# Patient Record
Sex: MALE | Race: WHITE | NOT HISPANIC OR LATINO | Employment: FULL TIME | URBAN - NONMETROPOLITAN AREA
[De-identification: names, ages, dates, MRNs, and addresses within clinical notes are randomized per-mention and may not be internally consistent; named-entity substitution may affect disease eponyms.]

---

## 2024-01-23 ENCOUNTER — APPOINTMENT (OUTPATIENT)
Dept: CARDIOLOGY | Facility: HOSPITAL | Age: 64
End: 2024-01-23
Payer: MEDICARE

## 2024-01-23 ENCOUNTER — APPOINTMENT (OUTPATIENT)
Dept: RADIOLOGY | Facility: HOSPITAL | Age: 64
End: 2024-01-23
Payer: MEDICARE

## 2024-01-23 ENCOUNTER — HOSPITAL ENCOUNTER (EMERGENCY)
Facility: HOSPITAL | Age: 64
Discharge: OTHER INSTITUTION | End: 2024-01-24
Attending: FAMILY MEDICINE
Payer: MEDICARE

## 2024-01-23 DIAGNOSIS — J15.9 BACTERIAL PNEUMONIA: ICD-10-CM

## 2024-01-23 DIAGNOSIS — R07.9 CHEST PAIN, UNSPECIFIED TYPE: Primary | ICD-10-CM

## 2024-01-23 DIAGNOSIS — I25.9 CHEST PAIN DUE TO MYOCARDIAL ISCHEMIA, UNSPECIFIED ISCHEMIC CHEST PAIN TYPE: ICD-10-CM

## 2024-01-23 DIAGNOSIS — Z95.5 HISTORY OF CORONARY ARTERY STENT PLACEMENT: ICD-10-CM

## 2024-01-23 DIAGNOSIS — J10.1 INFLUENZA A H1N1 INFECTION: ICD-10-CM

## 2024-01-23 LAB
ALBUMIN SERPL BCP-MCNC: 4.4 G/DL (ref 3.4–5)
ALP SERPL-CCNC: 69 U/L (ref 33–136)
ALT SERPL W P-5'-P-CCNC: 43 U/L (ref 10–52)
ANION GAP BLDV CALCULATED.4IONS-SCNC: 10 MMOL/L (ref 10–25)
ANION GAP SERPL CALC-SCNC: 13 MMOL/L (ref 10–20)
APPEARANCE UR: CLEAR
AST SERPL W P-5'-P-CCNC: 39 U/L (ref 9–39)
ATRIAL RATE: 76 BPM
BASE EXCESS BLDV CALC-SCNC: 1.1 MMOL/L (ref -2–3)
BASOPHILS # BLD AUTO: 0.03 X10*3/UL (ref 0–0.1)
BASOPHILS NFR BLD AUTO: 0.4 %
BILIRUB SERPL-MCNC: 0.9 MG/DL (ref 0–1.2)
BILIRUB UR STRIP.AUTO-MCNC: NEGATIVE MG/DL
BNP SERPL-MCNC: 520 PG/ML (ref 0–99)
BODY TEMPERATURE: ABNORMAL
BUN SERPL-MCNC: 22 MG/DL (ref 6–23)
CA-I BLDV-SCNC: 1.17 MMOL/L (ref 1.1–1.33)
CALCIUM SERPL-MCNC: 8.9 MG/DL (ref 8.6–10.3)
CARDIAC TROPONIN I PNL SERPL HS: 98 NG/L (ref 0–20)
CARDIAC TROPONIN I PNL SERPL HS: 99 NG/L (ref 0–20)
CHLORIDE BLDV-SCNC: 100 MMOL/L (ref 98–107)
CHLORIDE SERPL-SCNC: 97 MMOL/L (ref 98–107)
CO2 SERPL-SCNC: 28 MMOL/L (ref 21–32)
COLOR UR: YELLOW
CREAT SERPL-MCNC: 1.06 MG/DL (ref 0.5–1.3)
D DIMER PPP FEU-MCNC: 700 NG/ML FEU
EGFRCR SERPLBLD CKD-EPI 2021: 78 ML/MIN/1.73M*2
EOSINOPHIL # BLD AUTO: 0.01 X10*3/UL (ref 0–0.7)
EOSINOPHIL NFR BLD AUTO: 0.1 %
ERYTHROCYTE [DISTWIDTH] IN BLOOD BY AUTOMATED COUNT: 13.4 % (ref 11.5–14.5)
FLOW: 0 LPM
FLUAV RNA RESP QL NAA+PROBE: DETECTED
FLUBV RNA RESP QL NAA+PROBE: NOT DETECTED
GLUCOSE BLDV-MCNC: 154 MG/DL (ref 74–99)
GLUCOSE SERPL-MCNC: 155 MG/DL (ref 74–99)
GLUCOSE UR STRIP.AUTO-MCNC: NEGATIVE MG/DL
HCO3 BLDV-SCNC: 25.1 MMOL/L (ref 22–26)
HCT VFR BLD AUTO: 45.6 % (ref 41–52)
HCT VFR BLD EST: 42 % (ref 41–52)
HGB BLD-MCNC: 14.4 G/DL (ref 13.5–17.5)
HGB BLDV-MCNC: 14.1 G/DL (ref 13.5–17.5)
IMM GRANULOCYTES # BLD AUTO: 0.03 X10*3/UL (ref 0–0.7)
IMM GRANULOCYTES NFR BLD AUTO: 0.4 % (ref 0–0.9)
INHALED O2 CONCENTRATION: 21 %
INR PPP: 1.2 (ref 0.9–1.1)
KETONES UR STRIP.AUTO-MCNC: NEGATIVE MG/DL
LACTATE BLDV-SCNC: 1.2 MMOL/L (ref 0.4–2)
LACTATE SERPL-SCNC: 1.1 MMOL/L (ref 0.4–2)
LEUKOCYTE ESTERASE UR QL STRIP.AUTO: NEGATIVE
LYMPHOCYTES # BLD AUTO: 0.51 X10*3/UL (ref 1.2–4.8)
LYMPHOCYTES NFR BLD AUTO: 6.1 %
MCH RBC QN AUTO: 29.8 PG (ref 26–34)
MCHC RBC AUTO-ENTMCNC: 31.6 G/DL (ref 32–36)
MCV RBC AUTO: 94 FL (ref 80–100)
MONOCYTES # BLD AUTO: 0.67 X10*3/UL (ref 0.1–1)
MONOCYTES NFR BLD AUTO: 8 %
MUCOUS THREADS #/AREA URNS AUTO: ABNORMAL /LPF
NEUTROPHILS # BLD AUTO: 7.08 X10*3/UL (ref 1.2–7.7)
NEUTROPHILS NFR BLD AUTO: 85 %
NITRITE UR QL STRIP.AUTO: NEGATIVE
NRBC BLD-RTO: 0 /100 WBCS (ref 0–0)
OXYHGB MFR BLDV: 81.5 % (ref 45–75)
P AXIS: 76 DEGREES
P OFFSET: 161 MS
P ONSET: 124 MS
PCO2 BLDV: 37 MM HG (ref 41–51)
PH BLDV: 7.44 PH (ref 7.33–7.43)
PH UR STRIP.AUTO: 5 [PH]
PLATELET # BLD AUTO: 140 X10*3/UL (ref 150–450)
PO2 BLDV: 51 MM HG (ref 35–45)
POTASSIUM BLDV-SCNC: 3.5 MMOL/L (ref 3.5–5.3)
POTASSIUM SERPL-SCNC: 3.8 MMOL/L (ref 3.5–5.3)
PR INTERVAL: 192 MS
PROT SERPL-MCNC: 6.8 G/DL (ref 6.4–8.2)
PROT UR STRIP.AUTO-MCNC: ABNORMAL MG/DL
PROTHROMBIN TIME: 13.2 SECONDS (ref 9.8–12.8)
Q ONSET: 220 MS
QRS COUNT: 12 BEATS
QRS DURATION: 96 MS
QT INTERVAL: 380 MS
QTC CALCULATION(BAZETT): 427 MS
QTC FREDERICIA: 411 MS
R AXIS: -1 DEGREES
RBC # BLD AUTO: 4.83 X10*6/UL (ref 4.5–5.9)
RBC # UR STRIP.AUTO: ABNORMAL /UL
RBC #/AREA URNS AUTO: >20 /HPF
SAO2 % BLDV: 84 % (ref 45–75)
SARS-COV-2 RNA RESP QL NAA+PROBE: NOT DETECTED
SODIUM BLDV-SCNC: 132 MMOL/L (ref 136–145)
SODIUM SERPL-SCNC: 134 MMOL/L (ref 136–145)
SP GR UR STRIP.AUTO: 1.03
T AXIS: 98 DEGREES
T OFFSET: 410 MS
UROBILINOGEN UR STRIP.AUTO-MCNC: 4 MG/DL
VENTRICULAR RATE: 76 BPM
WBC # BLD AUTO: 8.3 X10*3/UL (ref 4.4–11.3)
WBC #/AREA URNS AUTO: ABNORMAL /HPF

## 2024-01-23 PROCEDURE — 85025 COMPLETE CBC W/AUTO DIFF WBC: CPT | Performed by: FAMILY MEDICINE

## 2024-01-23 PROCEDURE — 36415 COLL VENOUS BLD VENIPUNCTURE: CPT | Performed by: FAMILY MEDICINE

## 2024-01-23 PROCEDURE — 84132 ASSAY OF SERUM POTASSIUM: CPT | Performed by: FAMILY MEDICINE

## 2024-01-23 PROCEDURE — 2500000002 HC RX 250 W HCPCS SELF ADMINISTERED DRUGS (ALT 637 FOR MEDICARE OP, ALT 636 FOR OP/ED)

## 2024-01-23 PROCEDURE — 96365 THER/PROPH/DIAG IV INF INIT: CPT

## 2024-01-23 PROCEDURE — 94640 AIRWAY INHALATION TREATMENT: CPT

## 2024-01-23 PROCEDURE — 9420000001 HC RT PATIENT EDUCATION 5 MIN

## 2024-01-23 PROCEDURE — 2550000001 HC RX 255 CONTRASTS: Performed by: FAMILY MEDICINE

## 2024-01-23 PROCEDURE — 81001 URINALYSIS AUTO W/SCOPE: CPT | Performed by: FAMILY MEDICINE

## 2024-01-23 PROCEDURE — 83880 ASSAY OF NATRIURETIC PEPTIDE: CPT | Performed by: FAMILY MEDICINE

## 2024-01-23 PROCEDURE — 71045 X-RAY EXAM CHEST 1 VIEW: CPT

## 2024-01-23 PROCEDURE — 85610 PROTHROMBIN TIME: CPT | Performed by: FAMILY MEDICINE

## 2024-01-23 PROCEDURE — 83605 ASSAY OF LACTIC ACID: CPT | Performed by: FAMILY MEDICINE

## 2024-01-23 PROCEDURE — 71275 CT ANGIOGRAPHY CHEST: CPT | Performed by: RADIOLOGY

## 2024-01-23 PROCEDURE — 71275 CT ANGIOGRAPHY CHEST: CPT

## 2024-01-23 PROCEDURE — 85379 FIBRIN DEGRADATION QUANT: CPT | Performed by: FAMILY MEDICINE

## 2024-01-23 PROCEDURE — 94664 DEMO&/EVAL PT USE INHALER: CPT

## 2024-01-23 PROCEDURE — 99285 EMERGENCY DEPT VISIT HI MDM: CPT | Performed by: FAMILY MEDICINE

## 2024-01-23 PROCEDURE — 84484 ASSAY OF TROPONIN QUANT: CPT | Performed by: FAMILY MEDICINE

## 2024-01-23 PROCEDURE — 93005 ELECTROCARDIOGRAM TRACING: CPT

## 2024-01-23 PROCEDURE — 80053 COMPREHEN METABOLIC PANEL: CPT | Performed by: FAMILY MEDICINE

## 2024-01-23 PROCEDURE — 2500000004 HC RX 250 GENERAL PHARMACY W/ HCPCS (ALT 636 FOR OP/ED)

## 2024-01-23 PROCEDURE — 87040 BLOOD CULTURE FOR BACTERIA: CPT | Mod: CONLAB | Performed by: FAMILY MEDICINE

## 2024-01-23 PROCEDURE — 71045 X-RAY EXAM CHEST 1 VIEW: CPT | Performed by: RADIOLOGY

## 2024-01-23 PROCEDURE — 87636 SARSCOV2 & INF A&B AMP PRB: CPT | Performed by: FAMILY MEDICINE

## 2024-01-23 RX ORDER — METFORMIN HYDROCHLORIDE 500 MG/1
500 TABLET ORAL
Status: ON HOLD | COMMUNITY
End: 2024-01-26 | Stop reason: SDUPTHER

## 2024-01-23 RX ORDER — CLOPIDOGREL BISULFATE 75 MG/1
75 TABLET ORAL DAILY
COMMUNITY

## 2024-01-23 RX ORDER — ACETAMINOPHEN 325 MG/1
650 TABLET ORAL ONCE
Status: COMPLETED | OUTPATIENT
Start: 2024-01-23 | End: 2024-01-23

## 2024-01-23 RX ORDER — ATORVASTATIN CALCIUM 80 MG/1
80 TABLET, FILM COATED ORAL NIGHTLY
COMMUNITY

## 2024-01-23 RX ORDER — CEFTRIAXONE 1 G/50ML
1 INJECTION, SOLUTION INTRAVENOUS ONCE
Status: COMPLETED | OUTPATIENT
Start: 2024-01-23 | End: 2024-01-23

## 2024-01-23 RX ORDER — CITALOPRAM 20 MG/1
20 TABLET, FILM COATED ORAL DAILY
COMMUNITY

## 2024-01-23 RX ORDER — IPRATROPIUM BROMIDE AND ALBUTEROL SULFATE 2.5; .5 MG/3ML; MG/3ML
3 SOLUTION RESPIRATORY (INHALATION) ONCE
Status: COMPLETED | OUTPATIENT
Start: 2024-01-23 | End: 2024-01-23

## 2024-01-23 RX ORDER — ACETAMINOPHEN 325 MG/1
TABLET ORAL
Status: COMPLETED
Start: 2024-01-23 | End: 2024-01-23

## 2024-01-23 RX ORDER — AMLODIPINE BESYLATE 10 MG/1
10 TABLET ORAL DAILY
COMMUNITY

## 2024-01-23 RX ORDER — ASPIRIN 81 MG/1
81 TABLET ORAL DAILY
COMMUNITY

## 2024-01-23 RX ORDER — LOSARTAN POTASSIUM 25 MG/1
25 TABLET ORAL DAILY
COMMUNITY
End: 2024-01-26 | Stop reason: HOSPADM

## 2024-01-23 RX ORDER — IPRATROPIUM BROMIDE AND ALBUTEROL SULFATE 2.5; .5 MG/3ML; MG/3ML
SOLUTION RESPIRATORY (INHALATION)
Status: COMPLETED
Start: 2024-01-23 | End: 2024-01-23

## 2024-01-23 RX ORDER — AZITHROMYCIN 100 MG/ML
INJECTION, POWDER, LYOPHILIZED, FOR SOLUTION INTRAVENOUS
Status: COMPLETED
Start: 2024-01-23 | End: 2024-01-23

## 2024-01-23 RX ORDER — CEFTRIAXONE 1 G/50ML
INJECTION, SOLUTION INTRAVENOUS
Status: COMPLETED
Start: 2024-01-23 | End: 2024-01-23

## 2024-01-23 RX ORDER — METOPROLOL SUCCINATE 25 MG/1
25 TABLET, EXTENDED RELEASE ORAL DAILY
COMMUNITY

## 2024-01-23 RX ADMIN — IPRATROPIUM BROMIDE AND ALBUTEROL SULFATE 3 ML: 2.5; .5 SOLUTION RESPIRATORY (INHALATION) at 03:21

## 2024-01-23 RX ADMIN — ACETAMINOPHEN 650 MG: 325 TABLET ORAL at 08:22

## 2024-01-23 RX ADMIN — IOHEXOL 50 ML: 350 INJECTION, SOLUTION INTRAVENOUS at 05:12

## 2024-01-23 RX ADMIN — CEFTRIAXONE 1 G: 1 INJECTION, SOLUTION INTRAVENOUS at 07:18

## 2024-01-23 RX ADMIN — AZITHROMYCIN 500 MG: 500 INJECTION, POWDER, LYOPHILIZED, FOR SOLUTION INTRAVENOUS at 07:59

## 2024-01-23 RX ADMIN — IPRATROPIUM BROMIDE AND ALBUTEROL SULFATE 3 ML: .5; 3 SOLUTION RESPIRATORY (INHALATION) at 03:21

## 2024-01-23 ASSESSMENT — COLUMBIA-SUICIDE SEVERITY RATING SCALE - C-SSRS
1. IN THE PAST MONTH, HAVE YOU WISHED YOU WERE DEAD OR WISHED YOU COULD GO TO SLEEP AND NOT WAKE UP?: NO
2. HAVE YOU ACTUALLY HAD ANY THOUGHTS OF KILLING YOURSELF?: NO
6. HAVE YOU EVER DONE ANYTHING, STARTED TO DO ANYTHING, OR PREPARED TO DO ANYTHING TO END YOUR LIFE?: NO

## 2024-01-23 ASSESSMENT — PAIN - FUNCTIONAL ASSESSMENT
PAIN_FUNCTIONAL_ASSESSMENT: 0-10
PAIN_FUNCTIONAL_ASSESSMENT: 0-10

## 2024-01-23 ASSESSMENT — PAIN SCALES - GENERAL: PAINLEVEL_OUTOF10: 3

## 2024-01-23 NOTE — ED PROVIDER NOTES
HPI   Chief Complaint   Patient presents with    Fatigue       HPI  This 63-year-old male patient history of coronary artery disease history of coronary artery stent placement hypertension and COPD came to the emergency room with a complaint of cough which she describes as a dry cough causing chest wall pain.  On the right side of chest symptoms present for last several days worse for last few days and decided come to ER for evaluation today.  He admitted to chronic peripheral edema and history of COPD quit smoking about 18 months ago.  He has history of coronary artery stent but not has been on Plavix and other medication.  Denies recent travel surgery or hospitalization history of blood clots any pain with deep breath.  Denies dizziness or change in palpitation.  Denies blackout or pass out..  Denies any abdominal pain.  Denies vomiting or diarrhea.  Will evaluate shortness up-to-date.  Patient denies any recent travel surgery hospitalization history of blood clot.        Family history: Reviewed  Social history: Reviewed quit smoking about 18 months ago denies substance abuse  Review of system: 10 review of system obtained review of system as described in HPI otherwise negative.                  Irena Coma Scale Score: 15                  Patient History   Past Medical History:   Diagnosis Date    Depression, unspecified 07/19/2019    Depression    Personal history of other diseases of the respiratory system 03/21/2017    History of acute sinusitis    Personal history of other diseases of the respiratory system 03/21/2017    History of acute sinusitis    Personal history of other specified conditions 10/10/2014    History of dysuria     Past Surgical History:   Procedure Laterality Date    ANKLE SURGERY  04/20/2015    Ankle Surgery    TONSILLECTOMY  04/20/2015    Tonsillectomy     No family history on file.  Social History     Tobacco Use    Smoking status: Not on file    Smokeless tobacco: Not on file    Substance Use Topics    Alcohol use: Not on file    Drug use: Not on file       Physical Exam   ED Triage Vitals   Temp Heart Rate Respirations BP   01/23/24 0317 01/23/24 0317 01/23/24 0317 01/23/24 0317   38 °C (100.4 °F) 67 (!) 22 160/85      SpO2 Temp Source Heart Rate Source Patient Position   01/23/24 0317 01/23/24 0317 -- 01/23/24 0317   91 % Temporal  Sitting      BP Location FiO2 (%)     01/23/24 0317 01/23/24 0321     Left arm 28 %       Physical Exam    CONSTITUTIONAL: Chronically sick looking male patient who was awake alert oriented x 3.  Sitting upright position noted to have 2+ pitting edema with chronic skin changes.  He was noted no tachypnea but he was already on O2 through nasal cannula.  Alert oriented x 3.     HENMT: The airway was intact. There was no ear or nose discharge. No drooling or stridor. Neck was supple. Talking and breathing comfortably.  EYES: Clear bilaterally, pupils equal, round and reactive to light. CARDIOVASCULAR: Regular rate and rhythm. No friction rub or murmur good peripheral pulses no peripheral edema. Nontender Homans sign negative. RESPIRATORY: Patient was breathing comfortably. No tachypnea no respiratory distress.  On auscultation he has diminished breath sounds crackles in the lung bases.  However has good skin perfusion.       GASTROINTESTINAL: Abdomen soft positive bowel sounds noted right lower quadrant tenderness no guarding, rebound surgery no CVA tenderness 20 no pulsatile mass.   GENITOURINARY:  No costovertebral angle tenderness. MUSCULOSKELETAL: Head was normocephalic atraumatic cervical thoracic lumbar spine nontender.  Chest was nontender  Both upper extremity good motion nontender intact distal pulse intact sensation. NEUROLOGICAL: Awake alert pleasant and cooperative. No motor or sensory deficit no arms selective noted. Intact neurovascular function and motor function. No facial drooping or drooling. Talking and breathing comfortably.  Cranial nerves  II to XII grossly intact. No arms selective noted. No nystagmus.  SKIN: Skin normal color for race, warm, dry and intact. No evidence of trauma or lesions. PSYCHIATRIC: Awake alert and without acute distress. No obvious depression, no suicidal thoughts or ideation. Appropriate mood. Talking and normal tone. HEME/LYMPH: No adenopathy or splenomegaly.        CRITICAL CARE    VITAL SIGNS:       heart rate 60 respiratory 19 blood pressure 152/71 pulse ox 92% on O2 through nasal cannula           DISPOSITION      ED Course & MDM   Diagnoses as of 02/26/24 1817   Chest pain, unspecified type   Chest pain due to myocardial ischemia, unspecified ischemic chest pain type   History of coronary artery stent placement   Bacterial pneumonia   Influenza A H1N1 infection   EKG obtained at 0301 hrs. in the morning showed sinus rhythm with PACs.  Artifact during the interpretation.  Inferior infarct infarct age undetermined.  No ST-T evaluation.  Nonspecific ST-T wave changes.  No STEMI.  Abnormal EKG AR at this EKG.    Medical Decision Making  63-year-old male patient history of coronary artery history of coronary artery stent placement has been on Plavix and multiple other medication he also has history of COPD has been having cough congestion and chest pain right-sided chest pain for last 2 to 3 days and decided come to the ER for evaluation early this morning.  He denies any pain in the arms admitted to chronic swelling the legs.  Denies hemoptysis, recent travel surgery or hospitalization.  He felt feverish but denies any chills.    Upon examination chronically sick looking male patient was awake and alert able to talk clearly and provide good history.  HEENT examination unremarkable neck is supple on auscultation he has some coarse rhonchi few scattered expiratory wheezing but moving air well bilaterally heart regular rate and neurovascularly abdomen soft nontender with noted chronic stasis edema type changes of the lower  extremity without any CP mild edema noted calf is nontender Homans' sign negative.  Intact distal pulse intact sensation.    EKG showed normal sinus rhythm with PVCs and finding consistent with inferior infarct age undetermined.  No STEMI.  Patient first troponin was 202nd troponin was 98 he also has mildly elevated D-dimer and his influenza A was positive influenza B and COVID-19 test were negative.  CTA chest showed no evidence of PE chronic lung disease secondary to tobacco smoking with right-sided infiltrate.    Blood culture obtained and patient was put on Zithromax and Rocephin he has been having symptoms for more than 2 days says that he was out of window of treatment for influenza A infection.  Due to underlying coronary artery disease abnormal EKG mildly elevated troponin pneumonia I requested transfer to higher level facility, there are no beds available at [facility] however transfer center advised to put a request for transfer and they will try to find a bed for this patient.  Patient and family agreed.  He is hemodynamic stable.  No hypoxemia or respiratory distress.    Patient will be signed out to Dr. Melgar, who is here to relieve me this morning at 8:00.  Procedure  Procedures     Armando Gong MD  02/26/24 1817

## 2024-01-23 NOTE — DISCHARGE INSTRUCTIONS
Patient's troponin was 100 9 repeat troponin 99 he came with complaint of right-sided chest pain for last couple days cough and congestion, upon examination coarse rhonchi and some crackles bilaterally in lung bases peripheral edema calf ulcer nontender.  No JVD.  Heart regular rate rhythm lungs auscultated abdomen soft nontender.  Transfer request has been placed through our transfer center  Patient is being signed out to Dr. Melgar at 8 AM.  EKG showed no ST-T evaluation.  Inferior infarct age undetermined heart rate of 76.  His first set of troponin 102nd troponin is 98.  CT angio showed right sided infiltrate he also have positive influenza A, but COVID-19 was negative.  Patient is hemodynamic stable.  I did order blood cultures Rocephin and Zithromax and requested transfer to [facility] any other high-level facility.  Due to elevated troponin prior history of stent placement and multiple comorbidities it was recommended to be transferred to high-level facility.  No beds at [facility] however transfer center advised her to put a request for transfer there to try to find a bed for this patient and one of the higher level facility.  Patient and family agreed.

## 2024-01-24 ENCOUNTER — APPOINTMENT (OUTPATIENT)
Dept: CARDIOLOGY | Facility: HOSPITAL | Age: 64
DRG: 193 | End: 2024-01-24
Payer: MEDICARE

## 2024-01-24 ENCOUNTER — HOSPITAL ENCOUNTER (INPATIENT)
Facility: HOSPITAL | Age: 64
LOS: 2 days | Discharge: HOME | DRG: 193 | End: 2024-01-26
Attending: INTERNAL MEDICINE | Admitting: INTERNAL MEDICINE
Payer: MEDICARE

## 2024-01-24 VITALS
RESPIRATION RATE: 18 BRPM | TEMPERATURE: 98 F | OXYGEN SATURATION: 96 % | SYSTOLIC BLOOD PRESSURE: 160 MMHG | HEIGHT: 73 IN | HEART RATE: 63 BPM | WEIGHT: 304.68 LBS | DIASTOLIC BLOOD PRESSURE: 72 MMHG | BODY MASS INDEX: 40.38 KG/M2

## 2024-01-24 DIAGNOSIS — E11.9 TYPE 2 DIABETES MELLITUS WITHOUT COMPLICATION, WITHOUT LONG-TERM CURRENT USE OF INSULIN (MULTI): ICD-10-CM

## 2024-01-24 DIAGNOSIS — J11.00 PNEUMONIA AND INFLUENZA: Primary | ICD-10-CM

## 2024-01-24 DIAGNOSIS — I50.9 HEART FAILURE, UNSPECIFIED (MULTI): ICD-10-CM

## 2024-01-24 DIAGNOSIS — J15.9 BACTERIAL PNEUMONIA: ICD-10-CM

## 2024-01-24 DIAGNOSIS — J10.1 INFLUENZA A H1N1 INFECTION: ICD-10-CM

## 2024-01-24 DIAGNOSIS — I50.33 ACUTE ON CHRONIC DIASTOLIC CONGESTIVE HEART FAILURE (MULTI): ICD-10-CM

## 2024-01-24 LAB
ANION GAP SERPL CALC-SCNC: 13 MMOL/L (ref 10–20)
AORTIC VALVE MEAN GRADIENT: 6 MMHG
AORTIC VALVE PEAK VELOCITY: 1.87 M/S
AV PEAK GRADIENT: 14 MMHG
AVA (PEAK VEL): 2.42 CM2
AVA (VTI): 2.28 CM2
BUN SERPL-MCNC: 22 MG/DL (ref 6–23)
CALCIUM SERPL-MCNC: 8.4 MG/DL (ref 8.6–10.3)
CHLORIDE SERPL-SCNC: 102 MMOL/L (ref 98–107)
CO2 SERPL-SCNC: 26 MMOL/L (ref 21–32)
CREAT SERPL-MCNC: 0.94 MG/DL (ref 0.5–1.3)
EGFRCR SERPLBLD CKD-EPI 2021: 90 ML/MIN/1.73M*2
EJECTION FRACTION APICAL 4 CHAMBER: 44.6
EJECTION FRACTION: 44 %
ERYTHROCYTE [DISTWIDTH] IN BLOOD BY AUTOMATED COUNT: 13.7 % (ref 11.5–14.5)
GLUCOSE BLD MANUAL STRIP-MCNC: 112 MG/DL (ref 74–99)
GLUCOSE BLD MANUAL STRIP-MCNC: 117 MG/DL (ref 74–99)
GLUCOSE BLD MANUAL STRIP-MCNC: 152 MG/DL (ref 74–99)
GLUCOSE BLD MANUAL STRIP-MCNC: 97 MG/DL (ref 74–99)
GLUCOSE SERPL-MCNC: 111 MG/DL (ref 74–99)
HCT VFR BLD AUTO: 44.3 % (ref 41–52)
HGB BLD-MCNC: 14.6 G/DL (ref 13.5–17.5)
LEFT ATRIUM VOLUME AREA LENGTH INDEX BSA: 32.8 ML/M2
LEFT VENTRICLE INTERNAL DIMENSION DIASTOLE: 6.04 CM (ref 3.5–6)
LEFT VENTRICULAR OUTFLOW TRACT DIAMETER: 2 CM
MCH RBC QN AUTO: 29.1 PG (ref 26–34)
MCHC RBC AUTO-ENTMCNC: 33 G/DL (ref 32–36)
MCV RBC AUTO: 88 FL (ref 80–100)
MITRAL VALVE E/A RATIO: 0.86
MITRAL VALVE E/E' RATIO: 5.66
NRBC BLD-RTO: 0 /100 WBCS (ref 0–0)
PLATELET # BLD AUTO: 134 X10*3/UL (ref 150–450)
POTASSIUM SERPL-SCNC: 3.5 MMOL/L (ref 3.5–5.3)
RBC # BLD AUTO: 5.01 X10*6/UL (ref 4.5–5.9)
RIGHT VENTRICLE FREE WALL PEAK S': 13.9 CM/S
RIGHT VENTRICLE PEAK SYSTOLIC PRESSURE: 20.1 MMHG
SODIUM SERPL-SCNC: 137 MMOL/L (ref 136–145)
TRICUSPID ANNULAR PLANE SYSTOLIC EXCURSION: 2.7 CM
WBC # BLD AUTO: 4.9 X10*3/UL (ref 4.4–11.3)

## 2024-01-24 PROCEDURE — 93306 TTE W/DOPPLER COMPLETE: CPT

## 2024-01-24 PROCEDURE — 36415 COLL VENOUS BLD VENIPUNCTURE: CPT | Performed by: NURSE PRACTITIONER

## 2024-01-24 PROCEDURE — 2500000002 HC RX 250 W HCPCS SELF ADMINISTERED DRUGS (ALT 637 FOR MEDICARE OP, ALT 636 FOR OP/ED): Performed by: NURSE PRACTITIONER

## 2024-01-24 PROCEDURE — 1100000001 HC PRIVATE ROOM DAILY

## 2024-01-24 PROCEDURE — 2500000001 HC RX 250 WO HCPCS SELF ADMINISTERED DRUGS (ALT 637 FOR MEDICARE OP): Performed by: INTERNAL MEDICINE

## 2024-01-24 PROCEDURE — 82947 ASSAY GLUCOSE BLOOD QUANT: CPT

## 2024-01-24 PROCEDURE — 99223 1ST HOSP IP/OBS HIGH 75: CPT | Performed by: NURSE PRACTITIONER

## 2024-01-24 PROCEDURE — 2500000002 HC RX 250 W HCPCS SELF ADMINISTERED DRUGS (ALT 637 FOR MEDICARE OP, ALT 636 FOR OP/ED): Performed by: INTERNAL MEDICINE

## 2024-01-24 PROCEDURE — 85027 COMPLETE CBC AUTOMATED: CPT | Performed by: NURSE PRACTITIONER

## 2024-01-24 PROCEDURE — 84145 PROCALCITONIN (PCT): CPT | Mod: GEALAB | Performed by: INTERNAL MEDICINE

## 2024-01-24 PROCEDURE — 87449 NOS EACH ORGANISM AG IA: CPT | Mod: GEALAB | Performed by: NURSE PRACTITIONER

## 2024-01-24 PROCEDURE — 2500000004 HC RX 250 GENERAL PHARMACY W/ HCPCS (ALT 636 FOR OP/ED): Performed by: NURSE PRACTITIONER

## 2024-01-24 PROCEDURE — 2500000004 HC RX 250 GENERAL PHARMACY W/ HCPCS (ALT 636 FOR OP/ED): Performed by: INTERNAL MEDICINE

## 2024-01-24 PROCEDURE — 80048 BASIC METABOLIC PNL TOTAL CA: CPT | Performed by: NURSE PRACTITIONER

## 2024-01-24 PROCEDURE — 2500000001 HC RX 250 WO HCPCS SELF ADMINISTERED DRUGS (ALT 637 FOR MEDICARE OP): Performed by: NURSE PRACTITIONER

## 2024-01-24 PROCEDURE — 36415 COLL VENOUS BLD VENIPUNCTURE: CPT | Performed by: INTERNAL MEDICINE

## 2024-01-24 PROCEDURE — 93306 TTE W/DOPPLER COMPLETE: CPT | Performed by: INTERNAL MEDICINE

## 2024-01-24 PROCEDURE — 94640 AIRWAY INHALATION TREATMENT: CPT

## 2024-01-24 PROCEDURE — 87899 AGENT NOS ASSAY W/OPTIC: CPT | Mod: GEALAB | Performed by: NURSE PRACTITIONER

## 2024-01-24 RX ORDER — CLOPIDOGREL BISULFATE 75 MG/1
75 TABLET ORAL DAILY
Status: DISCONTINUED | OUTPATIENT
Start: 2024-01-24 | End: 2024-01-26 | Stop reason: HOSPADM

## 2024-01-24 RX ORDER — ACETAMINOPHEN 325 MG/1
TABLET ORAL
Status: COMPLETED
Start: 2024-01-24 | End: 2024-01-24

## 2024-01-24 RX ORDER — GUAIFENESIN 600 MG/1
1200 TABLET, EXTENDED RELEASE ORAL DAILY
Status: DISCONTINUED | OUTPATIENT
Start: 2024-01-24 | End: 2024-01-26 | Stop reason: HOSPADM

## 2024-01-24 RX ORDER — IPRATROPIUM BROMIDE AND ALBUTEROL SULFATE 2.5; .5 MG/3ML; MG/3ML
3 SOLUTION RESPIRATORY (INHALATION)
Status: DISCONTINUED | OUTPATIENT
Start: 2024-01-24 | End: 2024-01-26 | Stop reason: HOSPADM

## 2024-01-24 RX ORDER — FUROSEMIDE 10 MG/ML
20 INJECTION INTRAMUSCULAR; INTRAVENOUS EVERY 12 HOURS
Status: DISCONTINUED | OUTPATIENT
Start: 2024-01-24 | End: 2024-01-26 | Stop reason: HOSPADM

## 2024-01-24 RX ORDER — HYDRALAZINE HYDROCHLORIDE 20 MG/ML
10 INJECTION INTRAMUSCULAR; INTRAVENOUS ONCE
Status: COMPLETED | OUTPATIENT
Start: 2024-01-24 | End: 2024-01-24

## 2024-01-24 RX ORDER — ACETAMINOPHEN 325 MG/1
650 TABLET ORAL ONCE
Status: COMPLETED | OUTPATIENT
Start: 2024-01-24 | End: 2024-01-24

## 2024-01-24 RX ORDER — METOPROLOL SUCCINATE 25 MG/1
25 TABLET, EXTENDED RELEASE ORAL DAILY
Status: DISCONTINUED | OUTPATIENT
Start: 2024-01-24 | End: 2024-01-26 | Stop reason: HOSPADM

## 2024-01-24 RX ORDER — CEFTRIAXONE 1 G/50ML
1 INJECTION, SOLUTION INTRAVENOUS EVERY 24 HOURS
Status: DISCONTINUED | OUTPATIENT
Start: 2024-01-24 | End: 2024-01-25

## 2024-01-24 RX ORDER — ALBUTEROL SULFATE 0.83 MG/ML
2.5 SOLUTION RESPIRATORY (INHALATION) EVERY 2 HOUR PRN
Status: DISCONTINUED | OUTPATIENT
Start: 2024-01-24 | End: 2024-01-26 | Stop reason: HOSPADM

## 2024-01-24 RX ORDER — INSULIN LISPRO 100 [IU]/ML
0-10 INJECTION, SOLUTION INTRAVENOUS; SUBCUTANEOUS
Status: DISCONTINUED | OUTPATIENT
Start: 2024-01-24 | End: 2024-01-26 | Stop reason: HOSPADM

## 2024-01-24 RX ORDER — ACETAMINOPHEN 325 MG/1
650 TABLET ORAL EVERY 6 HOURS PRN
Status: DISCONTINUED | OUTPATIENT
Start: 2024-01-24 | End: 2024-01-26 | Stop reason: HOSPADM

## 2024-01-24 RX ORDER — OSELTAMIVIR PHOSPHATE 75 MG/1
75 CAPSULE ORAL 2 TIMES DAILY
Status: DISCONTINUED | OUTPATIENT
Start: 2024-01-24 | End: 2024-01-26 | Stop reason: HOSPADM

## 2024-01-24 RX ORDER — CITALOPRAM 20 MG/1
20 TABLET, FILM COATED ORAL DAILY
Status: DISCONTINUED | OUTPATIENT
Start: 2024-01-24 | End: 2024-01-26 | Stop reason: HOSPADM

## 2024-01-24 RX ORDER — TALC
3 POWDER (GRAM) TOPICAL DAILY
Status: DISCONTINUED | OUTPATIENT
Start: 2024-01-24 | End: 2024-01-25

## 2024-01-24 RX ORDER — SODIUM CHLORIDE 9 MG/ML
100 INJECTION, SOLUTION INTRAVENOUS CONTINUOUS
Status: DISCONTINUED | OUTPATIENT
Start: 2024-01-24 | End: 2024-01-24

## 2024-01-24 RX ORDER — DOCUSATE SODIUM 100 MG/1
100 CAPSULE, LIQUID FILLED ORAL 2 TIMES DAILY
Status: DISCONTINUED | OUTPATIENT
Start: 2024-01-24 | End: 2024-01-26 | Stop reason: HOSPADM

## 2024-01-24 RX ORDER — ATORVASTATIN CALCIUM 80 MG/1
80 TABLET, FILM COATED ORAL NIGHTLY
Status: DISCONTINUED | OUTPATIENT
Start: 2024-01-24 | End: 2024-01-26 | Stop reason: HOSPADM

## 2024-01-24 RX ORDER — ASPIRIN 81 MG/1
81 TABLET ORAL DAILY
Status: DISCONTINUED | OUTPATIENT
Start: 2024-01-24 | End: 2024-01-26 | Stop reason: HOSPADM

## 2024-01-24 RX ORDER — LOSARTAN POTASSIUM 50 MG/1
25 TABLET ORAL DAILY
Status: DISCONTINUED | OUTPATIENT
Start: 2024-01-24 | End: 2024-01-24

## 2024-01-24 RX ORDER — LOSARTAN POTASSIUM 50 MG/1
50 TABLET ORAL DAILY
Status: DISCONTINUED | OUTPATIENT
Start: 2024-01-25 | End: 2024-01-26 | Stop reason: HOSPADM

## 2024-01-24 RX ORDER — AMLODIPINE BESYLATE 10 MG/1
10 TABLET ORAL DAILY
Status: DISCONTINUED | OUTPATIENT
Start: 2024-01-24 | End: 2024-01-26 | Stop reason: HOSPADM

## 2024-01-24 RX ORDER — ONDANSETRON 4 MG/1
4 TABLET, FILM COATED ORAL EVERY 8 HOURS PRN
Status: DISCONTINUED | OUTPATIENT
Start: 2024-01-24 | End: 2024-01-24

## 2024-01-24 RX ORDER — DEXTROSE MONOHYDRATE 100 MG/ML
0.3 INJECTION, SOLUTION INTRAVENOUS ONCE AS NEEDED
Status: DISCONTINUED | OUTPATIENT
Start: 2024-01-24 | End: 2024-01-26 | Stop reason: HOSPADM

## 2024-01-24 RX ORDER — ENOXAPARIN SODIUM 100 MG/ML
40 INJECTION SUBCUTANEOUS EVERY 12 HOURS SCHEDULED
Status: DISCONTINUED | OUTPATIENT
Start: 2024-01-24 | End: 2024-01-26 | Stop reason: HOSPADM

## 2024-01-24 RX ORDER — DEXTROSE 50 % IN WATER (D50W) INTRAVENOUS SYRINGE
25
Status: DISCONTINUED | OUTPATIENT
Start: 2024-01-24 | End: 2024-01-26 | Stop reason: HOSPADM

## 2024-01-24 RX ORDER — IPRATROPIUM BROMIDE AND ALBUTEROL SULFATE 2.5; .5 MG/3ML; MG/3ML
3 SOLUTION RESPIRATORY (INHALATION) 3 TIMES DAILY
Status: DISCONTINUED | OUTPATIENT
Start: 2024-01-24 | End: 2024-01-24

## 2024-01-24 RX ORDER — ALBUTEROL SULFATE 0.83 MG/ML
2.5 SOLUTION RESPIRATORY (INHALATION) EVERY 6 HOURS PRN
Status: DISCONTINUED | OUTPATIENT
Start: 2024-01-24 | End: 2024-01-24

## 2024-01-24 RX ORDER — ONDANSETRON HYDROCHLORIDE 2 MG/ML
4 INJECTION, SOLUTION INTRAVENOUS EVERY 8 HOURS PRN
Status: DISCONTINUED | OUTPATIENT
Start: 2024-01-24 | End: 2024-01-24

## 2024-01-24 RX ADMIN — IPRATROPIUM BROMIDE AND ALBUTEROL SULFATE 3 ML: 2.5; .5 SOLUTION RESPIRATORY (INHALATION) at 19:57

## 2024-01-24 RX ADMIN — DOCUSATE SODIUM 100 MG: 100 CAPSULE, LIQUID FILLED ORAL at 21:07

## 2024-01-24 RX ADMIN — ENOXAPARIN SODIUM 40 MG: 40 INJECTION SUBCUTANEOUS at 09:43

## 2024-01-24 RX ADMIN — ATORVASTATIN CALCIUM 80 MG: 80 TABLET, FILM COATED ORAL at 21:07

## 2024-01-24 RX ADMIN — CEFTRIAXONE SODIUM 1 G: 1 INJECTION, SOLUTION INTRAVENOUS at 09:43

## 2024-01-24 RX ADMIN — ACETAMINOPHEN 650 MG: 325 TABLET ORAL at 01:40

## 2024-01-24 RX ADMIN — DOCUSATE SODIUM 100 MG: 100 CAPSULE, LIQUID FILLED ORAL at 09:44

## 2024-01-24 RX ADMIN — OSELTAMIVIR PHOSPHATE 75 MG: 75 CAPSULE ORAL at 21:07

## 2024-01-24 RX ADMIN — CLOPIDOGREL 75 MG: 75 TABLET ORAL at 09:44

## 2024-01-24 RX ADMIN — PERFLUTREN 2.5 ML OF DILUTION: 6.52 INJECTION, SUSPENSION INTRAVENOUS at 11:25

## 2024-01-24 RX ADMIN — ENOXAPARIN SODIUM 40 MG: 40 INJECTION SUBCUTANEOUS at 21:08

## 2024-01-24 RX ADMIN — AMLODIPINE BESYLATE 10 MG: 10 TABLET ORAL at 09:44

## 2024-01-24 RX ADMIN — METOPROLOL SUCCINATE 25 MG: 25 TABLET, EXTENDED RELEASE ORAL at 09:44

## 2024-01-24 RX ADMIN — ASPIRIN 81 MG: 81 TABLET, COATED ORAL at 09:44

## 2024-01-24 RX ADMIN — FUROSEMIDE 20 MG: 10 INJECTION, SOLUTION INTRAMUSCULAR; INTRAVENOUS at 09:44

## 2024-01-24 RX ADMIN — HYDRALAZINE HYDROCHLORIDE 10 MG: 20 INJECTION INTRAMUSCULAR; INTRAVENOUS at 09:44

## 2024-01-24 RX ADMIN — HYDRALAZINE HYDROCHLORIDE 10 MG: 20 INJECTION INTRAMUSCULAR; INTRAVENOUS at 22:27

## 2024-01-24 RX ADMIN — GUAIFENESIN 1200 MG: 600 TABLET ORAL at 09:44

## 2024-01-24 RX ADMIN — IPRATROPIUM BROMIDE AND ALBUTEROL SULFATE 3 ML: 2.5; .5 SOLUTION RESPIRATORY (INHALATION) at 11:41

## 2024-01-24 RX ADMIN — Medication 1 CAPSULE: at 11:41

## 2024-01-24 RX ADMIN — AZITHROMYCIN MONOHYDRATE 500 MG: 500 INJECTION, POWDER, LYOPHILIZED, FOR SOLUTION INTRAVENOUS at 11:34

## 2024-01-24 RX ADMIN — Medication 3 MG: at 18:49

## 2024-01-24 RX ADMIN — FUROSEMIDE 20 MG: 10 INJECTION, SOLUTION INTRAMUSCULAR; INTRAVENOUS at 18:49

## 2024-01-24 RX ADMIN — SODIUM CHLORIDE 100 ML/HR: 9 INJECTION, SOLUTION INTRAVENOUS at 05:51

## 2024-01-24 RX ADMIN — LOSARTAN POTASSIUM 25 MG: 50 TABLET, FILM COATED ORAL at 09:44

## 2024-01-24 RX ADMIN — OSELTAMIVIR PHOSPHATE 75 MG: 75 CAPSULE ORAL at 09:44

## 2024-01-24 RX ADMIN — CITALOPRAM HYDROBROMIDE 20 MG: 20 TABLET ORAL at 09:44

## 2024-01-24 SDOH — SOCIAL STABILITY: SOCIAL INSECURITY: HAS ANYONE EVER THREATENED TO HURT YOUR FAMILY OR YOUR PETS?: NO

## 2024-01-24 SDOH — SOCIAL STABILITY: SOCIAL INSECURITY: DOES ANYONE TRY TO KEEP YOU FROM HAVING/CONTACTING OTHER FRIENDS OR DOING THINGS OUTSIDE YOUR HOME?: NO

## 2024-01-24 SDOH — SOCIAL STABILITY: SOCIAL INSECURITY: WERE YOU ABLE TO COMPLETE ALL THE BEHAVIORAL HEALTH SCREENINGS?: YES

## 2024-01-24 SDOH — SOCIAL STABILITY: SOCIAL INSECURITY: DO YOU FEEL UNSAFE GOING BACK TO THE PLACE WHERE YOU ARE LIVING?: NO

## 2024-01-24 SDOH — SOCIAL STABILITY: SOCIAL INSECURITY: ARE YOU OR HAVE YOU BEEN THREATENED OR ABUSED PHYSICALLY, EMOTIONALLY, OR SEXUALLY BY ANYONE?: NO

## 2024-01-24 SDOH — SOCIAL STABILITY: SOCIAL INSECURITY: DO YOU FEEL ANYONE HAS EXPLOITED OR TAKEN ADVANTAGE OF YOU FINANCIALLY OR OF YOUR PERSONAL PROPERTY?: NO

## 2024-01-24 SDOH — SOCIAL STABILITY: SOCIAL INSECURITY: ABUSE: ADULT

## 2024-01-24 SDOH — SOCIAL STABILITY: SOCIAL INSECURITY: ARE THERE ANY APPARENT SIGNS OF INJURIES/BEHAVIORS THAT COULD BE RELATED TO ABUSE/NEGLECT?: NO

## 2024-01-24 SDOH — SOCIAL STABILITY: SOCIAL INSECURITY: HAVE YOU HAD THOUGHTS OF HARMING ANYONE ELSE?: NO

## 2024-01-24 ASSESSMENT — COGNITIVE AND FUNCTIONAL STATUS - GENERAL
MOBILITY SCORE: 24
DAILY ACTIVITIY SCORE: 24
PATIENT BASELINE BEDBOUND: NO
DAILY ACTIVITIY SCORE: 24
MOBILITY SCORE: 24
MOBILITY SCORE: 24
DAILY ACTIVITIY SCORE: 24

## 2024-01-24 ASSESSMENT — LIFESTYLE VARIABLES
SKIP TO QUESTIONS 9-10: 1
HOW OFTEN DO YOU HAVE A DRINK CONTAINING ALCOHOL: NEVER
HOW MANY STANDARD DRINKS CONTAINING ALCOHOL DO YOU HAVE ON A TYPICAL DAY: PATIENT DOES NOT DRINK
AUDIT-C TOTAL SCORE: 0
HOW OFTEN DO YOU HAVE 6 OR MORE DRINKS ON ONE OCCASION: NEVER
AUDIT-C TOTAL SCORE: 0

## 2024-01-24 ASSESSMENT — ENCOUNTER SYMPTOMS
CHILLS: 0
FATIGUE: 1
AGITATION: 0
DYSURIA: 0
FEVER: 1
EYE REDNESS: 0
EYE PAIN: 0
ARTHRALGIAS: 1
BRUISES/BLEEDS EASILY: 0
MYALGIAS: 1
WOUND: 0
DIAPHORESIS: 1
VOMITING: 0
SINUS PAIN: 0
POLYDIPSIA: 0
COUGH: 1
SORE THROAT: 0
CONFUSION: 0
ABDOMINAL PAIN: 0
WEAKNESS: 0
DIARRHEA: 0
SHORTNESS OF BREATH: 1
HEADACHES: 0
NAUSEA: 0

## 2024-01-24 ASSESSMENT — ACTIVITIES OF DAILY LIVING (ADL)
PATIENT'S MEMORY ADEQUATE TO SAFELY COMPLETE DAILY ACTIVITIES?: YES
HEARING - RIGHT EAR: FUNCTIONAL
GROOMING: INDEPENDENT
BATHING: INDEPENDENT
FEEDING YOURSELF: INDEPENDENT
JUDGMENT_ADEQUATE_SAFELY_COMPLETE_DAILY_ACTIVITIES: YES
DRESSING YOURSELF: INDEPENDENT
WALKS IN HOME: INDEPENDENT
ADEQUATE_TO_COMPLETE_ADL: YES
HEARING - LEFT EAR: FUNCTIONAL
LACK_OF_TRANSPORTATION: NO
TOILETING: INDEPENDENT

## 2024-01-24 ASSESSMENT — PAIN SCALES - GENERAL
PAINLEVEL_OUTOF10: 0 - NO PAIN

## 2024-01-24 ASSESSMENT — PATIENT HEALTH QUESTIONNAIRE - PHQ9
1. LITTLE INTEREST OR PLEASURE IN DOING THINGS: NOT AT ALL
SUM OF ALL RESPONSES TO PHQ9 QUESTIONS 1 & 2: 0
2. FEELING DOWN, DEPRESSED OR HOPELESS: NOT AT ALL

## 2024-01-24 ASSESSMENT — PAIN - FUNCTIONAL ASSESSMENT
PAIN_FUNCTIONAL_ASSESSMENT: 0-10

## 2024-01-24 ASSESSMENT — COLUMBIA-SUICIDE SEVERITY RATING SCALE - C-SSRS
2. HAVE YOU ACTUALLY HAD ANY THOUGHTS OF KILLING YOURSELF?: NO
6. HAVE YOU EVER DONE ANYTHING, STARTED TO DO ANYTHING, OR PREPARED TO DO ANYTHING TO END YOUR LIFE?: NO
1. IN THE PAST MONTH, HAVE YOU WISHED YOU WERE DEAD OR WISHED YOU COULD GO TO SLEEP AND NOT WAKE UP?: NO

## 2024-01-24 NOTE — CONSULTS
"Inpatient consult to Cardiology  Consult performed by: Melissa Hoffmann PA-C  Consult ordered by: ALEX Kowalski-CNP        History Of Present Illness:    Mitch Olivarez is a 63 y.o. male presenting with shortness of breath and fatigue. Was noted to be hypoxic, influenza positive with multifocal pneumonia, new oxygen requirement. He continues to feel short of breath.        Last Recorded Vitals:  Vitals:    01/24/24 0355 01/24/24 0846 01/24/24 1249   BP: (!) 183/75 (!) 174/99    Pulse: 63 57    Resp: 20 20    Temp: 36.5 °C (97.7 °F) 36.6 °C (97.9 °F)    SpO2: 95% 95% 94%   Weight: 137 kg (301 lb 8 oz)     Height: 1.854 m (6' 1\")         Last Labs:  CBC - 1/24/2024:  6:38 AM  4.9 14.6 134    44.3      CMP - 1/24/2024:  6:37 AM  8.4 6.8 39 --- 0.9   _ 4.4 43 69      PTT - No results in last year.  1.2   13.2 _     Troponin I, High Sensitivity   Date/Time Value Ref Range Status   01/23/2024 05:11 AM 98 (HH) 0 - 20 ng/L Final     Comment:     Previous result verified on 1/23/2024 0425 on specimen/case [id number] called with component [facility] for procedure Troponin I, High Sensitivity with value 99 ng/L.   01/23/2024 03:14 AM 99 (HH) 0 - 20 ng/L Final     BNP   Date/Time Value Ref Range Status   01/23/2024 03:14  (H) 0 - 99 pg/mL Final   07/23/2021 10:18  (H) 0 - 99 pg/mL Final     Comment:     .  <100 pg/mL - Heart failure unlikely  100-299 pg/mL - Intermediate probability of acute heart  .               failure exacerbation. Correlate with clinical  .               context and patient history.    >=300 pg/mL - Heart Failure likely. Correlate with clinical  .               context and patient history.  BNP testing is performed using different testing   methodology at [facility] than at other   [facility] hospitals. Direct result comparisons should   only be made within the same method.       Hemoglobin A1C   Date/Time Value Ref Range Status   09/29/2023 10:52 AM 6.7 (H) 4.7 - 6.4 % Final " "    Comment:     Interpretation:     Standardized A1c  Good control or normal:  4-6% ( mg/dL avg)  Moderate control:        6.1-8.0% (120-180 mg/dL avg)  Poor control:            >8.0% (180 mg/dL avg)  With 4% as a baseline, each 1% increase = 30 mg/dL increase in average   glucose.  Taken from DCCT (Diabetes Control Complications Trial)   02/24/2023 01:12 PM 7.0 (H) 4.7 - 6.4 % Final     Comment:     Interpretation:     Standardized A1c  Good control or normal:  4-6% ( mg/dL avg)  Moderate control:        6.1-8.0% (120-180 mg/dL avg)  Poor control:            >8.0% (180 mg/dL avg)  With 4% as a baseline, each 1% increase = 30 mg/dL increase in average   glucose.  Taken from DCCT (Diabetes Control Complications Trial)     VLDL   Date/Time Value Ref Range Status   07/24/2021 05:23 AM 47 (H) 0 - 40 mg/dL Final      Last I/O:  No intake/output data recorded.    Past Cardiology Tests (Last 3 Years):    EKG:  ECG 12 lead 01/23/2024 (Preliminary)  NSR with PAC. No acute ischemic changes.     Echo:  Transthoracic Echocardiogram (07/26/2021):   CONCLUSIONS:   1. The left ventricular systolic function is low normal with a 55% estimated ejection fraction.   2. Spectral Doppler shows an impaired relaxation pattern of left ventricular diastolic filling.   3. There is moderate concentric left ventricular hypertrophy.   4. There is mild mitral and tricuspid regurgitation.   5. The estimated pulmonary artery pressure is mildly elevated with the RVSP at 36.1 mmHg.     Ejection Fractions:  No results found for: \"EF\"    Cath:  [facility] (07/23/2021):   CONCLUSIONS:   1. Single vessel coronary artery disease without proximal left anterior descending involvement.   2. Culprit vessel(s): right coronary artery.   3. Successful aspiration thrombectomy, PCI of RCA.   4. Elevated LV filling pressures.   5. Left Ventricular end-diastolic pressure = 36.    Stress Test:  No results found for this or any previous visit from the past 1095 " days.    Cardiac Imaging:  No results found for this or any previous visit from the past 1095 days.    CT angio chest for pulmonary embolism  1. No evidence of acute pulmonary embolism. Findings of smoking related airway disease.    2. Diffuse bronchial thickening. Extensive predominately right middle lobe infiltrate with patchy areas of nodular infiltrate also seen in the right lower lobe compatible with multifocal pneumonia. Follow-up is advised to resolution. Adenopathy seen in the mediastinum presumed reactive.         XR chest 1 view  1.  Bronchial thickening is seen. No focal infiltrate           Past Medical History:  He has a past medical history of CHF (congestive heart failure) (CMS/[facility]), Depression, unspecified (07/19/2019), Diabetes mellitus (CMS/[facility]), ED (erectile dysfunction), Heart disease, Hyperlipemia, Hypertension, Obesity, Personal history of other diseases of the respiratory system (03/21/2017), Personal history of other diseases of the respiratory system (03/21/2017), and Personal history of other specified conditions (10/10/2014).    Past Surgical History:  He has a past surgical history that includes Ankle surgery (04/20/2015); Tonsillectomy (04/20/2015); Vascular surgery; and Cardiac surgery.      Social History:  He reports that he quit smoking about 2 weeks ago. His smoking use included cigarettes. He has a 30.00 pack-year smoking history. He does not have any smokeless tobacco history on file. He reports that he does not currently use alcohol. He reports that he does not currently use drugs.    Family History:  Family History   Problem Relation Name Age of Onset    Other (htn) Father          Allergies:  Hydrocodone-acetaminophen and Lisinopril    Inpatient Medications:  Scheduled medications   Medication Dose Route Frequency    amLODIPine  10 mg oral Daily    aspirin  81 mg oral Daily    atorvastatin  80 mg oral Nightly    azithromycin  500 mg intravenous q24h    cefTRIAXone  1 g  intravenous q24h    citalopram  20 mg oral Daily    clopidogrel  75 mg oral Daily    docusate sodium  100 mg oral BID    enoxaparin  40 mg subcutaneous q12h WERNER    furosemide  20 mg intravenous q12h    guaiFENesin  1,200 mg oral Daily    insulin lispro  0-10 Units subcutaneous Before meals & nightly    ipratropium-albuteroL  3 mL nebulization TID    lactobacillus acidophilus  1 capsule oral Daily    [START ON 1/25/2024] losartan  50 mg oral Daily    melatonin  3 mg oral Daily    metoprolol succinate XL  25 mg oral Daily    oseltamivir  75 mg oral BID     PRN medications   Medication    acetaminophen    albuterol    dextrose 10 % in water (D10W)    dextrose    glucagon    oxygen     Continuous Medications   Medication Dose Last Rate     Outpatient Medications:  Current Outpatient Medications   Medication Instructions    amLODIPine (NORVASC) 10 mg, oral, Daily    aspirin 81 mg, oral, Daily    atorvastatin (LIPITOR) 80 mg, oral, Nightly    citalopram (CELEXA) 20 mg, oral, Daily    clopidogrel (PLAVIX) 75 mg, oral, Daily    losartan (COZAAR) 25 mg, oral, Daily    metFORMIN (GLUCOPHAGE) 500 mg, oral, 2 times daily with meals    metoprolol succinate XL (TOPROL-XL) 25 mg, oral, Daily, Do not crush or chew.       Physical Exam:  Physical Exam  Constitutional:       Appearance: Normal appearance.   HENT:      Nose: Nose normal.      Mouth/Throat:      Mouth: Mucous membranes are moist.   Eyes:      Conjunctiva/sclera: Conjunctivae normal.   Cardiovascular:      Rate and Rhythm: Normal rate and regular rhythm.      Heart sounds: No murmur heard.     No friction rub. No gallop.   Pulmonary:      Effort: Pulmonary effort is normal.      Breath sounds: Wheezing present.   Abdominal:      Palpations: Abdomen is soft.   Musculoskeletal:         General: Normal range of motion.      Right lower leg: Edema present.      Left lower leg: Edema present.   Skin:     General: Skin is warm and dry.   Neurological:      General: No focal  deficit present.      Mental Status: He is alert. Mental status is at baseline.   Psychiatric:         Mood and Affect: Mood normal.         Behavior: Behavior normal.            Assessment/Plan   Mitch Olivarez is a 62 yo male with a PMH of CAD s/p STEMI (July 2021) w/ PCI to RCA who appears to have been lost to cardiac follow up since August 2021. Presented with acute respiratory failure, multifocal pneumonia. Cardiology consulted d/t concern for heart failure.     #Acute hypoxic respiratory failure 2/2 Pneumonia, HF  #Acute on chronic heart failure (presumed diastolic)  #CAD s/p PCI to RCA in setting of STEMI (7/2021)    -Continue IV Lasix BID  -Strict I&O; Daily weight  -Echo recommended  -Continue ASA, Plavix, Atorvastatin, Losartan and Toprol per home dosing.   -Treatment of respiratory failure/PNA per primary  -Will follow      Peripheral IV 01/23/24 20 G Right Hand (Active)   Site Assessment Clean;Dry;Intact 01/24/24 0935   Dressing Status Clean;Dry;Occlusive 01/24/24 0935   Number of days: 1       Code Status:  Full Code    I spent  minutes in the professional and overall care of this patient.        Melissa Hoffmann PA-C

## 2024-01-24 NOTE — H&P
History Of Present Illness  Mitch Olivarez is a 63 y.o. male with a pertinent hx of CAD s/p DESx2 to ostial, prox, mid RCA, diastolic impairment on ECHO from 2021, HTN, IDDM and obesity who presented to [facility] yesterday with symptoms of orthopnea, dyspnea fatigue, diaphoresis, dry cough and pleuritic chest pain for the past 4 days. He was found to have Flu A and multifocal PNA on chest CT (no evidence for PE) and is requiring oxygen. He said his oxygen decreased to 84% at home before he came in. He quit smoking 2 weeks ago. Troponin 99, 98 and BNP is 520. D-dimer 700, + lymphocytopenia. Blood cultures in process x's 2.      Past Medical History  He has a past medical history of Depression, HTN, ED, CAD, IDDM, HPLD, Obesity, tinea pedis, diastolic impairment.    Surgical History  He has a past surgical history that includes Ankle surgery (04/20/2015); Tonsillectomy (04/20/2015); Vascular surgery; and Cardiac surgery with stent placement.     Social History  He reports that he has quit smoking. His smoking use included cigarettes. He has a 30.00 pack-year smoking history. He does not have any smokeless tobacco history on file. He reports that he does not currently use alcohol. He reports that he does not currently use drugs.    Family History  HTN-father     Allergies  Hydrocodone-acetaminophen and Lisinopril    Review of Systems   Constitutional:  Positive for diaphoresis, fatigue and fever. Negative for chills.        +subjective fevers   HENT:  Negative for sinus pain and sore throat.    Eyes:  Negative for pain and redness.   Respiratory:  Positive for cough and shortness of breath.    Cardiovascular:  Positive for chest pain.        +pleuritic & orthopnea   Gastrointestinal:  Negative for abdominal pain, diarrhea, nausea and vomiting.   Endocrine: Negative for polydipsia and polyuria.   Genitourinary:  Negative for dysuria and urgency.   Musculoskeletal:  Positive for arthralgias and myalgias.   Skin:   Negative for rash and wound.   Neurological:  Negative for weakness and headaches.   Hematological:  Does not bruise/bleed easily.   Psychiatric/Behavioral:  Negative for agitation, behavioral problems and confusion.         Physical Exam  Constitutional:       General: He is not in acute distress.     Appearance: He is obese. He is ill-appearing and diaphoretic. He is not toxic-appearing.   HENT:      Head: Normocephalic and atraumatic.      Mouth/Throat:      Mouth: Mucous membranes are moist.      Pharynx: Oropharynx is clear.   Eyes:      Extraocular Movements: Extraocular movements intact.      Conjunctiva/sclera: Conjunctivae normal.   Cardiovascular:      Rate and Rhythm: Normal rate and regular rhythm.      Pulses: Normal pulses.      Heart sounds: Normal heart sounds. No murmur heard.     Comments: NO JVD  Pulmonary:      Breath sounds: Wheezing present. No rhonchi or rales.      Comments: + use of accessory muscles and on 2-3 L NC; + increased resp effort  Abdominal:      General: Bowel sounds are normal. There is distension.      Palpations: Abdomen is soft.      Tenderness: There is no abdominal tenderness. There is no guarding.   Musculoskeletal:      Cervical back: Normal range of motion.      Right lower leg: Edema present.      Left lower leg: Edema present.      Comments: +1 BLE   Skin:     General: Skin is warm.      Coloration: Skin is pale.   Neurological:      General: No focal deficit present.      Mental Status: He is alert and oriented to person, place, and time.      Motor: No weakness.   Psychiatric:         Mood and Affect: Mood normal.         Behavior: Behavior normal.       Last Recorded Vitals  There were no vitals taken for this visit.    Relevant Results  Scheduled medications  amLODIPine, 10 mg, oral, Daily  aspirin, 81 mg, oral, Daily  atorvastatin, 80 mg, oral, Nightly  azithromycin, 500 mg, intravenous, q24h  cefTRIAXone, 1 g, intravenous, q24h  citalopram, 20 mg, oral,  Daily  clopidogrel, 75 mg, oral, Daily  docusate sodium, 100 mg, oral, BID  enoxaparin, 40 mg, subcutaneous, q12h WERNER  insulin lispro, 0-10 Units, subcutaneous, Before meals & nightly  losartan, 25 mg, oral, Daily  melatonin, 3 mg, oral, Daily  metoprolol succinate XL, 25 mg, oral, Daily  oseltamivir, 75 mg, oral, BID      Continuous medications  sodium chloride 0.9%, 100 mL/hr      PRN medications  PRN medications: acetaminophen, dextrose 10 % in water (D10W), dextrose, glucagon    Results for orders placed or performed during the hospital encounter of 01/23/24 (from the past 24 hour(s))   Lactate   Result Value Ref Range    Lactate 1.1 0.4 - 2.0 mmol/L   Blood Culture    Specimen: Peripheral Venipuncture; Blood culture   Result Value Ref Range    Blood Culture Loaded on Instrument - Culture in progress    Blood Culture    Specimen: Peripheral Venipuncture; Blood culture   Result Value Ref Range    Blood Culture Loaded on Instrument - Culture in progress    Troponin I, High Sensitivity   Result Value Ref Range    Troponin I, High Sensitivity 98 (HH) 0 - 20 ng/L   Urinalysis with Reflex Microscopic   Result Value Ref Range    Color, Urine Yellow Straw, Yellow    Appearance, Urine Clear Clear    Specific Gravity, Urine 1.030 1.005 - 1.035    pH, Urine 5.0 5.0, 5.5, 6.0, 6.5, 7.0, 7.5, 8.0    Protein, Urine 100 (2+) (N) NEGATIVE mg/dL    Glucose, Urine NEGATIVE NEGATIVE mg/dL    Blood, Urine MODERATE (2+) (A) NEGATIVE    Ketones, Urine NEGATIVE NEGATIVE mg/dL    Bilirubin, Urine NEGATIVE NEGATIVE    Urobilinogen, Urine 4.0 (N) <2.0 mg/dL    Nitrite, Urine NEGATIVE NEGATIVE    Leukocyte Esterase, Urine NEGATIVE NEGATIVE   Microscopic Only, Urine   Result Value Ref Range    WBC, Urine NONE 1-5, NONE /HPF    RBC, Urine >20 (A) NONE, 1-2, 3-5 /HPF    Mucus, Urine 1+ Reference range not established. /LPF     ECG 12 lead    Result Date: 1/23/2024  Sinus rhythm with Premature supraventricular complexes Inferior infarct  (cited on or before 23-JUL-2021) Abnormal ECG When compared with ECG of 26-JUL-2021 05:24, Premature supraventricular complexes are now Present QRS axis Shifted right T wave inversion no longer evident in Inferior leads    CT angio chest for pulmonary embolism    Result Date: 1/23/2024  Interpreted By:  Timoteo Bray, STUDY: CT ANGIO CHEST FOR PULMONARY EMBOLISM;  1/23/2024 5:21 am   INDICATION: Signs/Symptoms:Chest pain, shortness of breath right-sided chest pain, elevated D-dimer.   COMPARISON: None   ACCESSION NUMBER(S): [id number]   ORDERING CLINICIAN: BRENNA UGARTE   TECHNIQUE: Helical data acquisition of the chest was obtained after intravenous administration of , as per PE protocol. Images were reformatted in coronal and sagittal planes. Axial and coronal maximum intensity projection (MIP) images were created and reviewed.   FINDINGS: No evidence of acute pulmonary embolism. There is emphysema. Bronchial thickening and findings of smoking related airway disease. Extensive nodular infiltrate seen in the right middle lobe compatible with pneumonia. There is also more subtle tree-in-bud type infiltrates in the right lower lobe. Adenopathy within the mediastinum is presumed reactive. Pretracheal lymph node measuring 2 cm. Subcarinal 1.4 cm lymph node. No thoracic aneurysm or dissection. No pericardial effusion. No acute osseous abnormality seen.   Imaging of the upper abdomen shows fatty infiltration of the liver.       1. No evidence of acute pulmonary embolism. Findings of smoking related airway disease. 2. Diffuse bronchial thickening. Extensive predominately right middle lobe infiltrate with patchy areas of nodular infiltrate also seen in the right lower lobe compatible with multifocal pneumonia. Follow-up is advised to resolution. Adenopathy seen in the mediastinum presumed reactive.     MACRO: None   Signed by: Timoteo Bray 1/23/2024 6:00 AM Dictation workstation:   [id number]    XR chest 1  view    Result Date: 1/23/2024  Interpreted By:  Timoteo Bray, STUDY: XR CHEST 1 VIEW;  1/23/2024 3:59 am   INDICATION: Signs/Symptoms:Chest Pain.   COMPARISON: 07/23/2021   ACCESSION NUMBER(S): [id number]   ORDERING CLINICIAN: BRENNA UGARTE   FINDINGS:         CARDIOMEDIASTINAL SILHOUETTE: Cardiomediastinal silhouette is normal in size and configuration.   LUNGS: Diffuse bronchial thickening is seen. Patchy bibasilar atelectasis. No new infiltrate when compared to prior.   ABDOMEN: No remarkable upper abdominal findings.   BONES: No acute osseous changes.       1.  Bronchial thickening is seen. No focal infiltrate       MACRO: None   Signed by: Timoteo Bray 1/23/2024 4:15 AM Dictation workstation:   [id number]      Assessment/Plan   Principal Problem:  Acute Hypoxic Respiratory Failure d/t Multifocal Gram Positive Pneumonia and Influenza  Azithromycin/Rocephin  Breathing txt/Mucinex  Oxygen-wean, pulse ox  Tamiflu BID for 5 days  Strep and legionella studies-follow up  ID consulted-appreciate recs  Blood cultures in process x's 2    Diastolic CHF Exacerbation  -Describes orthopnea, BLE + 1, BNP   -Diastolic impairment on ECHO from 2021  Repeat ECHO-follow up  Cardiology consulted-appreciate recs  Lasix 20 mg BID started    HTN Urgency  Hydralazine once  Added 20 lasix BID  Continue Losartan, metoprolol  Cardiology consulted-appreciate recs    Elevated Troponin likely from Demand Ischemia, Not from MI  -Treat both causes above  Troponin 99, 98  EKG reviewed and no new ST changes  Cardiology consulted-appreciate recs    Hx of CAD s/p DESx2 to ostial, prox, mid RCA  Aspirin and Plavix  Cardiology consulted-suspect does not need to be on Plavix    IDDM   Diabetic diet  Sliding scale and accucheck ACHS    Obesity  Wt loss encouraged    DVT PX  Lovenox  SCDS    Ladonna Walker, APRN-CNP

## 2024-01-24 NOTE — PROGRESS NOTES
Emergency Medicine Transition of Care Note.    I received Mitch Olivarez in signout from Katerina  Please see the previous ED provider note for all HPI, PE and MDM up to the time of signout at 2000. This is in addition to the primary record.    In brief Mitch Olivarez is an 63 y.o. male presenting for   Chief Complaint   Patient presents with    Fatigue     At the time of signout we were awaiting: transfer    Diagnoses as of 01/24/24 0718   Chest pain, unspecified type   Chest pain due to myocardial ischemia, unspecified ischemic chest pain type   History of coronary artery stent placement   Bacterial pneumonia   Influenza A H1N1 infection       Medical Decision Making  Patient was signed out to me in stable condition by Dr. Melgar, who received the patient in signout from Dr. Lundberg.  This gentleman came in with fatigue weakness and chest pain.  He has a history of a coronary artery stent and influenza.  He was found to be positive for influenza A and also had elevated troponin and a BNP. He also was found to have an elevated dimer.  He was transferred to [facility] in stable condition.    Final diagnoses:   [R07.9] Chest pain, unspecified type   [I25.9] Chest pain due to myocardial ischemia, unspecified ischemic chest pain type   [Z95.5] History of coronary artery stent placement   [J15.9] Bacterial pneumonia   [J10.1] Influenza A H1N1 infection           Procedure  Procedures    Rogelio Berkowitz MD

## 2024-01-24 NOTE — PROGRESS NOTES
Patient boarding in the ED.  No acute events during the day.  Awaiting bed placement at [facility].  Sting comfortably.  Going to [facility] due to elevated troponin associated with influenza A.  It appears he has a bed ready, just awaiting transport.

## 2024-01-24 NOTE — CONSULTS
Consults  Referred by HANY Corrales    Primary MD: Kunal Poole, DO    Reason For Consult  Pneumonia / influenza    History Of Present Illness  Mitch Olivarez is a 63 y.o. male, hx of obesity, hx of DM, hx of HTN, hx of CAD, recently quit smoking, he was admitted for a 4 day hx of dry cough, weakness, exertional dyspnea, Rt sided chest pain, he was hypoxic and needed O2, the ct with bronchial thickening and Rt sided infiltrate, his viral screen is positive for influenza A, no fever, no sore throat, no headache, no emesis or diarrhea, he is not vaccinated     Past Medical History  He has a past medical history of CHF (congestive heart failure) (CMS/[facility]), Depression, unspecified (2019), Diabetes mellitus (CMS/[facility]), ED (erectile dysfunction), Heart disease, Hyperlipemia, Hypertension, Obesity, Personal history of other diseases of the respiratory system (2017), Personal history of other diseases of the respiratory system (2017), and Personal history of other specified conditions (10/10/2014).    Surgical History  He has a past surgical history that includes Ankle surgery (2015); Tonsillectomy (2015); Vascular surgery; and Cardiac surgery.     Social History     Occupational History    Not on file   Tobacco Use    Smoking status: Former     Packs/day: 1.00     Years: 30.00     Additional pack years: 0.00     Total pack years: 30.00     Types: Cigarettes     Quit date: 1/10/2024     Years since quittin.0    Smokeless tobacco: Not on file   Vaping Use    Vaping Use: Never used   Substance and Sexual Activity    Alcohol use: Not Currently    Drug use: Not Currently    Sexual activity: Not on file     Travel History   Travel since 23    No documented travel since 23          Family History  Family History   Problem Relation Name Age of Onset    Other (htn) Father       Allergies  Hydrocodone-acetaminophen and Lisinopril       There is no immunization history on file for this  patient.  Pneumonia and influenza vaccines are planned  Cannon fall risk 35, preventive protocol was implemented  Depression screen is negative  5 min of smoking cessation was provided    Medications  Home medications:  Medications Prior to Admission   Medication Sig Dispense Refill Last Dose    amLODIPine (Norvasc) 10 mg tablet Take 1 tablet (10 mg) by mouth once daily.   1/23/2024    aspirin 81 mg EC tablet Take 1 tablet (81 mg) by mouth once daily.   1/23/2024    atorvastatin (Lipitor) 80 mg tablet Take 1 tablet (80 mg) by mouth once daily at bedtime.   Past Week    citalopram (CeleXA) 20 mg tablet Take 1 tablet (20 mg) by mouth once daily.   1/23/2024    clopidogrel (Plavix) 75 mg tablet Take 1 tablet (75 mg) by mouth once daily.   1/23/2024    losartan (Cozaar) 25 mg tablet Take 1 tablet (25 mg) by mouth once daily.   1/23/2024    metFORMIN (Glucophage) 500 mg tablet Take 1 tablet (500 mg) by mouth 2 times a day with meals.   1/23/2024    metoprolol succinate XL (Toprol-XL) 25 mg 24 hr tablet Take 1 tablet (25 mg) by mouth once daily. Do not crush or chew.   1/23/2024     Current medications:  Scheduled medications  amLODIPine, 10 mg, oral, Daily  aspirin, 81 mg, oral, Daily  atorvastatin, 80 mg, oral, Nightly  azithromycin, 500 mg, intravenous, q24h  cefTRIAXone, 1 g, intravenous, q24h  citalopram, 20 mg, oral, Daily  clopidogrel, 75 mg, oral, Daily  docusate sodium, 100 mg, oral, BID  enoxaparin, 40 mg, subcutaneous, q12h WERNER  furosemide, 20 mg, intravenous, q12h  guaiFENesin, 1,200 mg, oral, Daily  insulin lispro, 0-10 Units, subcutaneous, Before meals & nightly  ipratropium-albuteroL, 3 mL, nebulization, TID  losartan, 25 mg, oral, Daily  melatonin, 3 mg, oral, Daily  metoprolol succinate XL, 25 mg, oral, Daily  oseltamivir, 75 mg, oral, BID  perflutren lipid microspheres, 0.5-10 mL of dilution, intravenous, Once in imaging  perflutren protein A microsphere, 0.5 mL, intravenous, Once in imaging  sulfur  "hexafluoride microsphr, 2 mL, intravenous, Once in imaging      Continuous medications     PRN medications  PRN medications: acetaminophen, albuterol, dextrose 10 % in water (D10W), dextrose, glucagon, oxygen    Review of Systems   All other systems reviewed and are negative.       Objective  Range of Vitals (last 24 hours)  Heart Rate:  [44-64]   Temp:  [36.5 °C (97.7 °F)-36.7 °C (98 °F)]   Resp:  [17-28]   BP: (143-183)/()   Height:  [185.4 cm (6' 1\")]   Weight:  [137 kg (301 lb 8 oz)]   SpO2:  [92 %-100 %]   Daily Weight  01/24/24 : 137 kg (301 lb 8 oz)    Body mass index is 39.78 kg/m².   Nutritional consult    Physical Exam  Constitutional:       Appearance: Normal appearance.   HENT:      Head: Normocephalic and atraumatic.      Mouth/Throat:      Mouth: Mucous membranes are moist.      Pharynx: Oropharynx is clear.   Eyes:      Pupils: Pupils are equal, round, and reactive to light.   Cardiovascular:      Rate and Rhythm: Normal rate and regular rhythm.      Heart sounds: Normal heart sounds.   Pulmonary:      Effort: Pulmonary effort is normal.      Breath sounds: Normal breath sounds.   Abdominal:      General: Abdomen is flat. Bowel sounds are normal.      Palpations: Abdomen is soft.   Musculoskeletal:      Cervical back: Normal range of motion.   Neurological:      Mental Status: He is alert.          Relevant Results  Outside Hospital Results  reviewed  Labs  Results from last 72 hours   Lab Units 01/24/24  0638 01/23/24  0314   WBC AUTO x10*3/uL 4.9 8.3   HEMOGLOBIN g/dL 14.6 14.4   HEMATOCRIT % 44.3 45.6   PLATELETS AUTO x10*3/uL 134* 140*   NEUTROS PCT AUTO %  --  85.0   LYMPHS PCT AUTO %  --  6.1   MONOS PCT AUTO %  --  8.0   EOS PCT AUTO %  --  0.1     Results from last 72 hours   Lab Units 01/24/24  0637 01/23/24  0314   SODIUM mmol/L 137 134*   POTASSIUM mmol/L 3.5 3.8   CHLORIDE mmol/L 102 97*   CO2 mmol/L 26 28   BUN mg/dL 22 22   CREATININE mg/dL 0.94 1.06   GLUCOSE mg/dL 111* 155* " "  CALCIUM mg/dL 8.4* 8.9   ANION GAP mmol/L 13 13   EGFR mL/min/1.73m*2 90 78     Results from last 72 hours   Lab Units 01/23/24  0314   ALK PHOS U/L 69   BILIRUBIN TOTAL mg/dL 0.9   PROTEIN TOTAL g/dL 6.8   ALT U/L 43   AST U/L 39   ALBUMIN g/dL 4.4     Estimated Creatinine Clearance: 114.1 mL/min (by C-G formula based on SCr of 0.94 mg/dL).  No results found for: \"CRP\", \"SEDRATE\"  No results found for: \"HIV1X2\", \"HIVCONF\", \"[id number]\"  No results found for: \"HEPCABINIT\", \"HEPCAB\", \"HCVPCRQUANT\"  Microbiology  Reviewed  Imaging  Reviewed       Assessment/Plan     Respiratory failure / likely COPD / influenza A / pneumonia    Recommendations :  Tamiflu protocol  Azithromycin and Rocephin  Cultures  Procalcitonin  Chest PT / incentive spirometry    I spent minutes in the professional and overall care of this patient.      Dallas Nam MD  "

## 2024-01-24 NOTE — CARE PLAN
The patient's goals for the shift include  to increase mobility without intense SOB    The clinical goals for the shift include monitor for respiratory distress    Over the shift, the patient did not make progress toward the following goals. Barriers to progression include none. Recommendations to address these barriers include none.

## 2024-01-25 LAB
ANION GAP SERPL CALC-SCNC: 13 MMOL/L (ref 10–20)
BNP SERPL-MCNC: 151 PG/ML (ref 0–99)
BUN SERPL-MCNC: 19 MG/DL (ref 6–23)
CALCIUM SERPL-MCNC: 8.5 MG/DL (ref 8.6–10.3)
CHLORIDE SERPL-SCNC: 102 MMOL/L (ref 98–107)
CO2 SERPL-SCNC: 27 MMOL/L (ref 21–32)
CREAT SERPL-MCNC: 0.93 MG/DL (ref 0.5–1.3)
EGFRCR SERPLBLD CKD-EPI 2021: >90 ML/MIN/1.73M*2
GLUCOSE BLD MANUAL STRIP-MCNC: 102 MG/DL (ref 74–99)
GLUCOSE BLD MANUAL STRIP-MCNC: 116 MG/DL (ref 74–99)
GLUCOSE BLD MANUAL STRIP-MCNC: 189 MG/DL (ref 74–99)
GLUCOSE BLD MANUAL STRIP-MCNC: 280 MG/DL (ref 74–99)
GLUCOSE SERPL-MCNC: 116 MG/DL (ref 74–99)
LEGIONELLA AG UR QL: NEGATIVE
POTASSIUM SERPL-SCNC: 3.3 MMOL/L (ref 3.5–5.3)
PROCALCITONIN SERPL-MCNC: 0.18 NG/ML
S PNEUM AG UR QL: NEGATIVE
SODIUM SERPL-SCNC: 139 MMOL/L (ref 136–145)

## 2024-01-25 PROCEDURE — 82947 ASSAY GLUCOSE BLOOD QUANT: CPT

## 2024-01-25 PROCEDURE — 2500000001 HC RX 250 WO HCPCS SELF ADMINISTERED DRUGS (ALT 637 FOR MEDICARE OP): Performed by: NURSE PRACTITIONER

## 2024-01-25 PROCEDURE — 99233 SBSQ HOSP IP/OBS HIGH 50: CPT | Performed by: INTERNAL MEDICINE

## 2024-01-25 PROCEDURE — 2500000001 HC RX 250 WO HCPCS SELF ADMINISTERED DRUGS (ALT 637 FOR MEDICARE OP)

## 2024-01-25 PROCEDURE — 94640 AIRWAY INHALATION TREATMENT: CPT

## 2024-01-25 PROCEDURE — 2500000002 HC RX 250 W HCPCS SELF ADMINISTERED DRUGS (ALT 637 FOR MEDICARE OP, ALT 636 FOR OP/ED): Performed by: INTERNAL MEDICINE

## 2024-01-25 PROCEDURE — 2500000005 HC RX 250 GENERAL PHARMACY W/O HCPCS: Performed by: NURSE PRACTITIONER

## 2024-01-25 PROCEDURE — 2500000004 HC RX 250 GENERAL PHARMACY W/ HCPCS (ALT 636 FOR OP/ED): Performed by: NURSE PRACTITIONER

## 2024-01-25 PROCEDURE — 36415 COLL VENOUS BLD VENIPUNCTURE: CPT | Performed by: INTERNAL MEDICINE

## 2024-01-25 PROCEDURE — 1100000001 HC PRIVATE ROOM DAILY

## 2024-01-25 PROCEDURE — 2500000002 HC RX 250 W HCPCS SELF ADMINISTERED DRUGS (ALT 637 FOR MEDICARE OP, ALT 636 FOR OP/ED): Performed by: NURSE PRACTITIONER

## 2024-01-25 PROCEDURE — 2500000001 HC RX 250 WO HCPCS SELF ADMINISTERED DRUGS (ALT 637 FOR MEDICARE OP): Performed by: INTERNAL MEDICINE

## 2024-01-25 PROCEDURE — 83880 ASSAY OF NATRIURETIC PEPTIDE: CPT | Performed by: INTERNAL MEDICINE

## 2024-01-25 PROCEDURE — 2500000004 HC RX 250 GENERAL PHARMACY W/ HCPCS (ALT 636 FOR OP/ED)

## 2024-01-25 PROCEDURE — 80048 BASIC METABOLIC PNL TOTAL CA: CPT | Performed by: INTERNAL MEDICINE

## 2024-01-25 RX ORDER — TALC
6 POWDER (GRAM) TOPICAL DAILY
Status: DISCONTINUED | OUTPATIENT
Start: 2024-01-25 | End: 2024-01-26 | Stop reason: HOSPADM

## 2024-01-25 RX ORDER — POTASSIUM CHLORIDE 20 MEQ/1
40 TABLET, EXTENDED RELEASE ORAL ONCE
Status: DISCONTINUED | OUTPATIENT
Start: 2024-01-25 | End: 2024-01-26 | Stop reason: HOSPADM

## 2024-01-25 RX ORDER — SPIRONOLACTONE 25 MG/1
25 TABLET ORAL DAILY
Status: DISCONTINUED | OUTPATIENT
Start: 2024-01-25 | End: 2024-01-26 | Stop reason: HOSPADM

## 2024-01-25 RX ORDER — AZITHROMYCIN 250 MG/1
250 TABLET, FILM COATED ORAL
Status: DISCONTINUED | OUTPATIENT
Start: 2024-01-25 | End: 2024-01-26 | Stop reason: HOSPADM

## 2024-01-25 RX ORDER — POTASSIUM CHLORIDE 1.5 G/1.58G
40 POWDER, FOR SOLUTION ORAL ONCE
Status: COMPLETED | OUTPATIENT
Start: 2024-01-25 | End: 2024-01-25

## 2024-01-25 RX ADMIN — IPRATROPIUM BROMIDE AND ALBUTEROL SULFATE 3 ML: 2.5; .5 SOLUTION RESPIRATORY (INHALATION) at 14:52

## 2024-01-25 RX ADMIN — ASPIRIN 81 MG: 81 TABLET, COATED ORAL at 08:07

## 2024-01-25 RX ADMIN — LOSARTAN POTASSIUM 50 MG: 50 TABLET, FILM COATED ORAL at 08:27

## 2024-01-25 RX ADMIN — IPRATROPIUM BROMIDE AND ALBUTEROL SULFATE 3 ML: 2.5; .5 SOLUTION RESPIRATORY (INHALATION) at 20:54

## 2024-01-25 RX ADMIN — ENOXAPARIN SODIUM 40 MG: 40 INJECTION SUBCUTANEOUS at 08:07

## 2024-01-25 RX ADMIN — OSELTAMIVIR PHOSPHATE 75 MG: 75 CAPSULE ORAL at 08:07

## 2024-01-25 RX ADMIN — INSULIN LISPRO 6 UNITS: 100 INJECTION, SOLUTION INTRAVENOUS; SUBCUTANEOUS at 20:18

## 2024-01-25 RX ADMIN — ATORVASTATIN CALCIUM 80 MG: 80 TABLET, FILM COATED ORAL at 20:09

## 2024-01-25 RX ADMIN — METOPROLOL SUCCINATE 25 MG: 25 TABLET, EXTENDED RELEASE ORAL at 09:11

## 2024-01-25 RX ADMIN — OSELTAMIVIR PHOSPHATE 75 MG: 75 CAPSULE ORAL at 20:09

## 2024-01-25 RX ADMIN — GUAIFENESIN 1200 MG: 600 TABLET ORAL at 08:07

## 2024-01-25 RX ADMIN — CITALOPRAM HYDROBROMIDE 20 MG: 20 TABLET ORAL at 08:07

## 2024-01-25 RX ADMIN — Medication 6 MG: at 18:47

## 2024-01-25 RX ADMIN — SPIRONOLACTONE 25 MG: 25 TABLET ORAL at 14:13

## 2024-01-25 RX ADMIN — AZITHROMYCIN MONOHYDRATE 500 MG: 500 INJECTION, POWDER, LYOPHILIZED, FOR SOLUTION INTRAVENOUS at 08:07

## 2024-01-25 RX ADMIN — INSULIN LISPRO 2 UNITS: 100 INJECTION, SOLUTION INTRAVENOUS; SUBCUTANEOUS at 11:44

## 2024-01-25 RX ADMIN — POTASSIUM CHLORIDE 40 MEQ: 1.5 POWDER, FOR SOLUTION ORAL at 09:26

## 2024-01-25 RX ADMIN — Medication 1 L/MIN: at 14:52

## 2024-01-25 RX ADMIN — ENOXAPARIN SODIUM 40 MG: 40 INJECTION SUBCUTANEOUS at 20:09

## 2024-01-25 RX ADMIN — FUROSEMIDE 20 MG: 10 INJECTION, SOLUTION INTRAMUSCULAR; INTRAVENOUS at 18:47

## 2024-01-25 RX ADMIN — CEFTRIAXONE SODIUM 1 G: 1 INJECTION, SOLUTION INTRAVENOUS at 06:35

## 2024-01-25 RX ADMIN — Medication 1 CAPSULE: at 08:07

## 2024-01-25 RX ADMIN — DOCUSATE SODIUM 100 MG: 100 CAPSULE, LIQUID FILLED ORAL at 08:07

## 2024-01-25 RX ADMIN — FUROSEMIDE 20 MG: 10 INJECTION, SOLUTION INTRAMUSCULAR; INTRAVENOUS at 08:08

## 2024-01-25 RX ADMIN — AMLODIPINE BESYLATE 10 MG: 10 TABLET ORAL at 08:07

## 2024-01-25 RX ADMIN — CLOPIDOGREL 75 MG: 75 TABLET ORAL at 08:07

## 2024-01-25 RX ADMIN — DOCUSATE SODIUM 100 MG: 100 CAPSULE, LIQUID FILLED ORAL at 20:09

## 2024-01-25 ASSESSMENT — COGNITIVE AND FUNCTIONAL STATUS - GENERAL
DAILY ACTIVITIY SCORE: 24
MOBILITY SCORE: 24
MOBILITY SCORE: 24
DAILY ACTIVITIY SCORE: 24

## 2024-01-25 ASSESSMENT — PAIN - FUNCTIONAL ASSESSMENT: PAIN_FUNCTIONAL_ASSESSMENT: 0-10

## 2024-01-25 ASSESSMENT — PAIN SCALES - GENERAL
PAINLEVEL_OUTOF10: 0 - NO PAIN
PAINLEVEL_OUTOF10: 0 - NO PAIN

## 2024-01-25 NOTE — CARE PLAN
The patient's goals for the shift include  decrease SOB  Problem: Pain  Goal: My pain/discomfort is manageable  Outcome: Progressing     Problem: Daily Care  Goal: Daily care needs are met  Outcome: Progressing     Problem: Psychosocial Needs  Goal: Demonstrates ability to cope with hospitalization/illness  Outcome: Progressing  Goal: Collaborate with me, my family, and caregiver to identify my specific goals  Outcome: Progressing     Problem: Discharge Barriers  Goal: My discharge needs are met  Outcome: Progressing     Problem: Respiratory  Goal: Minimize anxiety/maximize coping throughout shift  Outcome: Progressing  Goal: Minimal/no exertional discomfort or dyspnea this shift  Outcome: Progressing  Goal: No signs of respiratory distress (eg. Use of accessory muscles. Peds grunting)  Outcome: Progressing  Goal: Patent airway maintained this shift  Outcome: Progressing       The clinical goals for the shift include patient will have decrese sob    Pt states he is feeling better and having less SOB throughout shift. Pt has been up in chair multiple times throughout the day.

## 2024-01-25 NOTE — PROGRESS NOTES
Mitch Olivarez is a 63 y.o. male on day 1 of admission presenting with Pneumonia and influenza.    Subjective   Interval History: no fever, less sob and cough        Review of Systems    Objective   Range of Vitals (last 24 hours)  Heart Rate:  [52-64]   Temp:  [36.1 °C (97 °F)-37 °C (98.6 °F)]   Resp:  [12-16]   BP: (145-171)/(78-93)   SpO2:  [88 %-94 %]   Daily Weight  01/24/24 : 137 kg (301 lb 8 oz)    Body mass index is 39.78 kg/m².    Physical Exam  Constitutional:       Appearance: Normal appearance.   HENT:      Head: Normocephalic and atraumatic.      Mouth/Throat:      Mouth: Mucous membranes are moist.      Pharynx: Oropharynx is clear.   Eyes:      Pupils: Pupils are equal, round, and reactive to light.   Cardiovascular:      Rate and Rhythm: Normal rate and regular rhythm.      Heart sounds: Normal heart sounds.   Pulmonary:      Effort: Pulmonary effort is normal.      Breath sounds: Normal breath sounds.   Abdominal:      General: Abdomen is flat. Bowel sounds are normal.      Palpations: Abdomen is soft.   Musculoskeletal:      Cervical back: Normal range of motion.   Neurological:      Mental Status: He is alert.         Antibiotics  amLODIPine (Norvasc) tablet 10 mg  aspirin EC tablet 81 mg  atorvastatin (Lipitor) tablet 80 mg  citalopram (CeleXA) tablet 20 mg  clopidogrel (Plavix) tablet 75 mg  losartan (Cozaar) tablet 25 mg  metoprolol succinate XL (Toprol-XL) 24 hr tablet 25 mg  cefTRIAXone (Rocephin) IVPB 1 g  azithromycin (Zithromax) in dextrose 5 % in water (D5W) 250 mL  mg  enoxaparin (Lovenox) syringe 40 mg  sodium chloride 0.9% infusion  acetaminophen (Tylenol) tablet 650 mg  ondansetron (Zofran) tablet 4 mg  ondansetron (Zofran) injection 4 mg  melatonin tablet 3 mg  docusate sodium (Colace) capsule 100 mg  oseltamivir (Tamiflu) capsule 75 mg  dextrose 50 % injection 25 g  glucagon (Glucagen) injection 1 mg  dextrose 10 % in water (D10W) infusion  insulin lispro (HumaLOG) injection  "0-10 Units  perflutren lipid microspheres (Definity) injection 0.5-10 mL of dilution  sulfur hexafluoride microsphr (Lumason) injection 24.28 mg  perflutren protein A microsphere (Optison) injection 0.5 mL  hydrALAZINE (Apresoline) injection 10 mg  furosemide (Lasix) injection 20 mg  oxygen (O2) therapy  guaiFENesin (Mucinex) 12 hr tablet 1,200 mg  ipratropium-albuteroL (Duo-Neb) 0.5-2.5 mg/3 mL nebulizer solution 3 mL  albuterol 2.5 mg /3 mL (0.083 %) nebulizer solution 2.5 mg  losartan (Cozaar) tablet 50 mg  lactobacillus acidophilus capsule 1 capsule  ipratropium-albuteroL (Duo-Neb) 0.5-2.5 mg/3 mL nebulizer solution 3 mL  albuterol 2.5 mg /3 mL (0.083 %) nebulizer solution 2.5 mg  hydrALAZINE (Apresoline) injection 10 mg  potassium chloride (Klor-Con) packet 40 mEq  melatonin tablet 6 mg  azithromycin (Zithromax) tablet 250 mg  potassium chloride CR (Klor-Con M20) ER tablet 40 mEq      Relevant Results  Labs  Results from last 72 hours   Lab Units 01/24/24  0638 01/23/24  0314   WBC AUTO x10*3/uL 4.9 8.3   HEMOGLOBIN g/dL 14.6 14.4   HEMATOCRIT % 44.3 45.6   PLATELETS AUTO x10*3/uL 134* 140*   NEUTROS PCT AUTO %  --  85.0   LYMPHS PCT AUTO %  --  6.1   MONOS PCT AUTO %  --  8.0   EOS PCT AUTO %  --  0.1     Results from last 72 hours   Lab Units 01/25/24  0637 01/24/24  0637 01/23/24  0314   SODIUM mmol/L 139 137 134*   POTASSIUM mmol/L 3.3* 3.5 3.8   CHLORIDE mmol/L 102 102 97*   CO2 mmol/L 27 26 28   BUN mg/dL 19 22 22   CREATININE mg/dL 0.93 0.94 1.06   GLUCOSE mg/dL 116* 111* 155*   CALCIUM mg/dL 8.5* 8.4* 8.9   ANION GAP mmol/L 13 13 13   EGFR mL/min/1.73m*2 >90 90 78     Results from last 72 hours   Lab Units 01/23/24  0314   ALK PHOS U/L 69   BILIRUBIN TOTAL mg/dL 0.9   PROTEIN TOTAL g/dL 6.8   ALT U/L 43   AST U/L 39   ALBUMIN g/dL 4.4     Estimated Creatinine Clearance: 115.4 mL/min (by C-G formula based on SCr of 0.93 mg/dL).  No results found for: \"CRP\"  Microbiology  Reviewed  Imaging  reviewed      "   Assessment/Plan     Respiratory failure / likely COPD / influenza A / pneumonia, procalcitonin 0.18     Recommendations :  Tamiflu protocol  Continue Azithromycin     I spent minutes in the professional and overall care of this patient.      Dallas Nam MD

## 2024-01-25 NOTE — CARE PLAN
The patient's goals for the shift include      The clinical goals for the shift include patient will have decrese sob    Over the shift, the patient did not make progress toward the following goals. Barriers to progression include . Recommendations to address these barriers include .    Problem: Respiratory  Goal: Minimal/no exertional discomfort or dyspnea this shift  Outcome: Progressing  Goal: Patent airway maintained this shift  Outcome: Progressing

## 2024-01-25 NOTE — PROGRESS NOTES
"Mitch Olivarez is a 63 y.o. male on day 1 of admission presenting with Pneumonia and influenza.    Subjective   Appears comfortable this morning. On 2L NC, but not wearing oxygen and sitting in chair.     Objective     Physical Exam  Vitals reviewed.   Constitutional:       General: He is not in acute distress.  HENT:      Head: Normocephalic and atraumatic.   Eyes:      Extraocular Movements: Extraocular movements intact.      Conjunctiva/sclera: Conjunctivae normal.      Pupils: Pupils are equal, round, and reactive to light.   Cardiovascular:      Rate and Rhythm: Normal rate and regular rhythm.      Pulses: Normal pulses.      Heart sounds: Normal heart sounds. No murmur heard.     No gallop.   Pulmonary:      Effort: Pulmonary effort is normal. No respiratory distress.      Breath sounds: Normal breath sounds.   Abdominal:      General: Abdomen is flat. There is no distension.      Palpations: Abdomen is soft.      Tenderness: There is no abdominal tenderness.   Musculoskeletal:      Right lower leg: No edema.      Left lower leg: No edema.   Skin:     General: Skin is warm and dry.   Neurological:      Mental Status: He is alert. Mental status is at baseline.   Psychiatric:         Mood and Affect: Mood normal.         Behavior: Behavior normal.         Last Recorded Vitals  Blood pressure 146/81, pulse 64, temperature 37 °C (98.6 °F), temperature source Temporal, resp. rate 16, height 1.854 m (6' 1\"), weight 137 kg (301 lb 8 oz), SpO2 91 %.  Intake/Output last 3 Shifts:  I/O last 3 completed shifts:  In: 300 (2.2 mL/kg) [IV Piggyback:300]  Out: 2725 (19.9 mL/kg) [Urine:2725 (0.6 mL/kg/hr)]  Weight: 136.8 kg     Relevant Results  Results for orders placed or performed during the hospital encounter of 01/24/24 (from the past 24 hour(s))   Procalcitonin   Result Value Ref Range    Procalcitonin 0.18 (H) <=0.07 ng/mL   POCT GLUCOSE   Result Value Ref Range    POCT Glucose 97 74 - 99 mg/dL   POCT GLUCOSE   Result " Value Ref Range    POCT Glucose 112 (H) 74 - 99 mg/dL   Basic Metabolic Panel   Result Value Ref Range    Glucose 116 (H) 74 - 99 mg/dL    Sodium 139 136 - 145 mmol/L    Potassium 3.3 (L) 3.5 - 5.3 mmol/L    Chloride 102 98 - 107 mmol/L    Bicarbonate 27 21 - 32 mmol/L    Anion Gap 13 10 - 20 mmol/L    Urea Nitrogen 19 6 - 23 mg/dL    Creatinine 0.93 0.50 - 1.30 mg/dL    eGFR >90 >60 mL/min/1.73m*2    Calcium 8.5 (L) 8.6 - 10.3 mg/dL   B-type natriuretic peptide   Result Value Ref Range     (H) 0 - 99 pg/mL   POCT GLUCOSE   Result Value Ref Range    POCT Glucose 116 (H) 74 - 99 mg/dL   POCT GLUCOSE   Result Value Ref Range    POCT Glucose 189 (H) 74 - 99 mg/dL       Assessment/Plan   Mitch Olivarez is a 64 yo male with a PMH of CAD s/p STEMI (July 2021) w/ PCI to RCA who appears to have been lost to cardiac follow up since August 2021. Presented with acute respiratory failure, multifocal pneumonia. Cardiology consulted d/t concern for heart failure.     Acute hypoxic respiratory failure 2/2 pneumonia vs CHF exacerbation  Acute on chronic heart failure (presumed diastolic)  CAD s/p PCI to RCA in setting of STEMI (7/2021)  -ECHO completed - estimated EF at 44%      1. Left ventricular systolic function is mildly decreased.      2. There is global hypokinesis of the left ventricle with minor regional variations.      3. Spectral Doppler shows an abnormal pattern of left ventricular diastolic filling.    Recommendations:  -continue diuresis - 20mg lasix IV BID  -will add spironolactone 25mg daily   -consider Entresto/Jardiance on discharge if medication is not cost prohibitive  -strict I/Os, daily weights  -cont. ASA, Plavix, atorvastatin, losartan, Toprol pe rhome dosing  -cont. treatment of pneumonia per primary team    Cardiology will continue to follow.    Boris Singh, DO  Internal Medicine, PGY-I

## 2024-01-25 NOTE — PROGRESS NOTES
Mitch Enriquez is a 63 y.o. male on day 1 of admission presenting with Pneumonia and influenza.      Subjective   Thinks he has turned the corner. Not quite as exhausted with movement. Cough is a little better. Didn't get much rest though       Objective     Last Recorded Vitals  /81 (BP Location: Left arm, Patient Position: Lying)   Pulse 64   Temp 37 °C (98.6 °F) (Temporal)   Resp 16   Wt 137 kg (301 lb 8 oz)   SpO2 91%   Intake/Output last 3 Shifts:    Intake/Output Summary (Last 24 hours) at 1/25/2024 1019  Last data filed at 1/25/2024 0907  Gross per 24 hour   Intake 600 ml   Output 2725 ml   Net -2125 ml       Admission Weight  Weight: 137 kg (301 lb 8 oz) (01/24/24 0355)    Daily Weight  01/24/24 : 137 kg (301 lb 8 oz)    Image Results  Transthoracic Echo (TTE) Complete     [facility], [address] [place] [address]                Tel [phone] and Fax [phone]    TRANSTHORACIC ECHOCARDIOGRAM REPORT       Patient Name:      MITCH ENRIQUEZ       Reading Physician:    [id number] Ancelmo Torres MD  Study Date:        1/24/2024            Ordering Provider:    [id number] COLIN WATT  MRN/PID:           [id number]             Fellow:  Accession#:        [id number]         Nurse:                Lynne Mccoy RN  Date of Birth/Age: 1960 / 63      Sonographer:          Janet Rodriguez RDCS                     years  Gender:            M                    Additional Staff:  Height:            185.42 cm            Admit Date:           1/24/2024  Weight:            136.53 kg            Admission Status:     Inpatient -                                                                Routine  BSA:               2.56 m2              Encounter#:           [id number]                                          Department Location:  [facility]                                                                 Invasive  Blood Pressure: 183 /75 mmHg    Study Type:    TRANSTHORACIC ECHO (TTE) COMPLETE  Diagnosis/ICD: Heart failure, unspecified-I50.9  Indication:    Congestive Heart Failure  CPT Code:      Echo Complete w Full Doppler-[id number]    Patient History:  Diabetes:          Yes  Pertinent History: Hyperlipidemia and HTN. Flu+, H/O cardiac stents.    Study Detail: The following Echo studies were performed: 2D, M-Mode, Doppler and                color flow. Technically challenging study due to patient lying in                supine position, body habitus and prominent lung artifact.                Definity used as a contrast agent for endocardial border                definition. Total contrast used for this procedure was 2.5 mL via                IV push. The patient was awake.       PHYSICIAN INTERPRETATION:  Left Ventricle: The left ventricular systolic function is mildly decreased. The calculated ejection fraction is mildly decreased at 44 % using the Camara's Bi-plane MOD calculation. There is global hypokinesis of the left ventricle with minor regional variations. The left ventricular cavity size is normal. Spectral Doppler shows an abnormal pattern of left ventricular diastolic filling.  Left Atrium: The left atrium is upper limits of normal in size.  Right Ventricle: The right ventricle is normal in size. There is normal right ventricular global systolic function.  Right Atrium: The right atrium is mildly dilated.  Aortic Valve: The aortic valve is probably trileaflet. There is no evidence of aortic valve regurgitation. The peak instantaneous gradient of the aortic valve is 14.0 mmHg. The mean gradient of the aortic valve is 6.0 mmHg.  Mitral Valve: The mitral valve is mildly thickened. There is trace mitral valve regurgitation.  Tricuspid Valve: The tricuspid valve is structurally normal. No evidence of tricuspid regurgitation.  Pulmonic Valve: The pulmonic valve is  not well visualized. The pulmonic valve regurgitation was not well visualized.  Pericardium: There is no pericardial effusion noted.  Aorta: The aortic root is normal.  In comparison to the previous echocardiogram(s): Compared with study from 7/26/2021, left ventricular systolic function is slightly worse.       CONCLUSIONS:   1. Left ventricular systolic function is mildly decreased.   2. There is global hypokinesis of the left ventricle with minor regional variations.   3. Spectral Doppler shows an abnormal pattern of left ventricular diastolic filling.    QUANTITATIVE DATA SUMMARY:  2D MEASUREMENTS:                            Normal Ranges:  Ao Root d:     3.60 cm    (2.0-3.7cm)  IVSd:          1.26 cm    (0.6-1.1cm)  LVPWd:         1.19 cm    (0.6-1.1cm)  LVIDd:         6.04 cm    (3.9-5.9cm)  LVIDs:         4.58 cm  LV Mass Index: 127.5 g/m2  LV % FS        24.2 %    LA VOLUME:                                Normal Ranges:  LA Vol A4C:        85.6 ml    (22+/-6mL/m2)  LA Vol A2C:        80.0 ml  LA Vol BP:         84.0 ml  LA Vol Index A4C:  33.4ml/m2  LA Vol Index A2C:  31.3 ml/m2  LA Vol Index BP:   32.8 ml/m2  LA Area A4C:       26.3 cm2  LA Area A2C:       25.8 cm2  LA Major Axis A4C: 6.9 cm  LA Major Axis A2C: 7.1 cm  LA Volume Index:   31.4 ml/m2  LA Vol A4C:        81.0 ml  LA Vol A2C:        77.8 ml    RA VOLUME BY A/L METHOD:                        Normal Ranges:  RA Area A4C: 21.6 cm2    M-MODE MEASUREMENTS:                   Normal Ranges:  Ao Root: 3.60 cm (2.0-3.7cm)  LAs:     5.70 cm (2.7-4.0cm)    AORTA MEASUREMENTS:                       Normal Ranges:  Ao Sinus, d: 3.60 cm (2.1-3.5cm)  Asc Ao, d:   3.60 cm (2.1-3.4cm)    LV SYSTOLIC FUNCTION BY 2D PLANIMETRY (MOD):                      Normal Ranges:  EF-A4C View: 44.6 % (>=55%)  EF-A2C View: 44.4 %  EF-Biplane:  44.3 %    LV DIASTOLIC FUNCTION:                                Normal Ranges:  MV Peak E:        0.57 m/s    (0.7-1.2 m/s)  MV Peak  A:        0.66 m/s    (0.42-0.7 m/s)  E/A Ratio:        0.86        (1.0-2.2)  MV e'             0.10 m/s    (>8.0)  MV lateral e'     0.10 m/s  MV medial e'      0.05 m/s  MV A Dur:         169.00 msec  E/e' Ratio:       5.66        (<8.0)  PulmV Sys Gilberto:    48.00 cm/s  PulmV Carrillo Gilberto:   43.40 cm/s  PulmV S/D Gilberto:    1.10  PulmV A Revs Gilberto: 21.90 cm/s  PulmV A Revs Dur: 124.00 msec    MITRAL VALVE:                  Normal Ranges:  MV DT: 282 msec (150-240msec)    AORTIC VALVE:                                     Normal Ranges:  AoV Vmax:                1.87 m/s  (<=1.7m/s)  AoV Peak P.0 mmHg (<20mmHg)  AoV Mean P.0 mmHg  (1.7-11.5mmHg)  LVOT Max Gilberto:            1.44 m/s  (<=1.1m/s)  AoV VTI:                 32.90 cm  (18-25cm)  LVOT VTI:                23.90 cm  LVOT Diameter:           2.00 cm   (1.8-2.4cm)  AoV Area, VTI:           2.28 cm2  (2.5-5.5cm2)  AoV Area,Vmax:           2.42 cm2  (2.5-4.5cm2)  AoV Dimensionless Index: 0.73       RIGHT VENTRICLE:  RV Basal 3.97 cm  RV Mid   2.88 cm  RV Major 10.3 cm  TAPSE:   26.9 mm  RV s'    0.14 m/s    TRICUSPID VALVE/RVSP:                              Normal Ranges:  Peak TR Velocity: 2.07 m/s  RV Syst Pressure: 20.1 mmHg (< 30mmHg)  IVC Diam:         2.25 cm    PULMONIC VALVE:                       Normal Ranges:  PV Max Gilberto: 1.0 m/s  (0.6-0.9m/s)  PV Max PG:  3.7 mmHg    Pulmonary Veins:  PulmV A Revs Dur: 124.00 msec  PulmV A Revs Gilberto: 21.90 cm/s  PulmV Carrillo Gilberto:   43.40 cm/s  PulmV S/D Gilberto:    1.10  PulmV Sys Gilberto:    48.00 cm/s       [id number] Ancelmo Torres MD  Electronically signed on 2024 at 4:04:14 PM       ** Final **    No current facility-administered medications on file prior to encounter.     Current Outpatient Medications on File Prior to Encounter   Medication Sig Dispense Refill    amLODIPine (Norvasc) 10 mg tablet Take 1 tablet (10 mg) by mouth once daily.      aspirin 81 mg EC tablet Take 1 tablet (81 mg) by mouth  once daily.      atorvastatin (Lipitor) 80 mg tablet Take 1 tablet (80 mg) by mouth once daily at bedtime.      citalopram (CeleXA) 20 mg tablet Take 1 tablet (20 mg) by mouth once daily.      clopidogrel (Plavix) 75 mg tablet Take 1 tablet (75 mg) by mouth once daily.      losartan (Cozaar) 25 mg tablet Take 1 tablet (25 mg) by mouth once daily.      metFORMIN (Glucophage) 500 mg tablet Take 1 tablet (500 mg) by mouth 2 times a day with meals.      metoprolol succinate XL (Toprol-XL) 25 mg 24 hr tablet Take 1 tablet (25 mg) by mouth once daily. Do not crush or chew.        Results for orders placed or performed during the hospital encounter of 01/24/24 (from the past 24 hour(s))   POCT GLUCOSE   Result Value Ref Range    POCT Glucose 117 (H) 74 - 99 mg/dL   Streptococcus pneumoniae Antigen, Urine    Specimen: Urine   Result Value Ref Range    Streptococcus pneumoniae Ag, Urine Negative Negative   Legionella Antigen, Urine    Specimen: Urine   Result Value Ref Range    L. pneumophila Urine Ag Negative Negative   Transthoracic Echo (TTE) Complete   Result Value Ref Range    AV pk gloria 1.87 m/s    AV mn grad 6.0 mmHg    LVOT diam 2.00 cm    LV biplane EF 44 %    MV avg E/e' ratio 5.66     MV E/A ratio 0.86     LA vol index A/L 32.8 ml/m2    Tricuspid annular plane systolic excursion 2.7 cm    RV free wall pk S' 13.90 cm/s    LVIDd 6.04 cm    RVSP 20.1 mmHg    Aortic Valve Area by Continuity of VTI 2.28 cm2    Aortic Valve Area by Continuity of Peak Velocity 2.42 cm2    AV pk grad 14.0 mmHg    LV A4C EF 44.6    Procalcitonin   Result Value Ref Range    Procalcitonin 0.18 (H) <=0.07 ng/mL   POCT GLUCOSE   Result Value Ref Range    POCT Glucose 97 74 - 99 mg/dL   POCT GLUCOSE   Result Value Ref Range    POCT Glucose 112 (H) 74 - 99 mg/dL   Basic Metabolic Panel   Result Value Ref Range    Glucose 116 (H) 74 - 99 mg/dL    Sodium 139 136 - 145 mmol/L    Potassium 3.3 (L) 3.5 - 5.3 mmol/L    Chloride 102 98 - 107 mmol/L     Bicarbonate 27 21 - 32 mmol/L    Anion Gap 13 10 - 20 mmol/L    Urea Nitrogen 19 6 - 23 mg/dL    Creatinine 0.93 0.50 - 1.30 mg/dL    eGFR >90 >60 mL/min/1.73m*2    Calcium 8.5 (L) 8.6 - 10.3 mg/dL   B-type natriuretic peptide   Result Value Ref Range     (H) 0 - 99 pg/mL   POCT GLUCOSE   Result Value Ref Range    POCT Glucose 116 (H) 74 - 99 mg/dL        Physical Exam  Constitutional:       Appearance: Normal appearance. He is obese.   HENT:      Head: Normocephalic and atraumatic.      Nose: Nose normal.   Eyes:      Extraocular Movements: Extraocular movements intact.      Pupils: Pupils are equal, round, and reactive to light.   Neck:      Comments: No jvd appreciated  Cardiovascular:      Rate and Rhythm: Normal rate and regular rhythm.      Pulses: Normal pulses.   Pulmonary:      Comments: Bilateral wheezing, expiratory, with prolonged expiratory phase.   Few crackles anteriorly  Abdominal:      General: Bowel sounds are normal.      Palpations: Abdomen is soft.   Musculoskeletal:      Comments: Trace pitting edema, bilateral lower extremities.   Skin:     General: Skin is warm and dry.   Neurological:      General: No focal deficit present.      Mental Status: He is alert and oriented to person, place, and time.   Psychiatric:         Mood and Affect: Mood normal.         Behavior: Behavior normal.       Relevant Results       Assessment/Plan        Principal Problem:    Pneumonia and influenza    Acute hypoxic respiratory failure, secondary to Influenza, possible chf and bronchospasm. Currently he is sitting, without oxygen, had been on 2 liters. Symptoms improved today. Allow to use as needed.   Influenza. Continue tamiflu. Afebrile  Decompensated systolic heart failure, improving. He is neg over 2 liters with the lasix. Appreciate cards input. Tele is stable. EF 44%  Hypokalemia. Secondary to diuresis, supplement, recheck in am.   Acute bronchospasm, add nebs, mucinex etc. CXR tomorrow  CAD,  stable  Possible CAP. ID recommending zithro/rocephin.   DM. Monitor, with coverage as needed  Possible dc tomorrow if improvement continues              Lorie Arredondo MD

## 2024-01-26 ENCOUNTER — APPOINTMENT (OUTPATIENT)
Dept: RADIOLOGY | Facility: HOSPITAL | Age: 64
DRG: 193 | End: 2024-01-26
Payer: MEDICARE

## 2024-01-26 VITALS
BODY MASS INDEX: 39.96 KG/M2 | OXYGEN SATURATION: 95 % | TEMPERATURE: 99.1 F | HEIGHT: 73 IN | SYSTOLIC BLOOD PRESSURE: 124 MMHG | HEART RATE: 52 BPM | DIASTOLIC BLOOD PRESSURE: 71 MMHG | WEIGHT: 301.5 LBS | RESPIRATION RATE: 18 BRPM

## 2024-01-26 PROBLEM — R07.9 CHEST PAIN: Status: RESOLVED | Noted: 2024-01-23 | Resolved: 2024-01-26

## 2024-01-26 LAB
ANION GAP SERPL CALC-SCNC: 14 MMOL/L (ref 10–20)
BUN SERPL-MCNC: 22 MG/DL (ref 6–23)
CALCIUM SERPL-MCNC: 8.4 MG/DL (ref 8.6–10.3)
CHLORIDE SERPL-SCNC: 104 MMOL/L (ref 98–107)
CO2 SERPL-SCNC: 24 MMOL/L (ref 21–32)
CREAT SERPL-MCNC: 0.91 MG/DL (ref 0.5–1.3)
CRP SERPL-MCNC: 0.85 MG/DL
EGFRCR SERPLBLD CKD-EPI 2021: >90 ML/MIN/1.73M*2
GLUCOSE BLD MANUAL STRIP-MCNC: 115 MG/DL (ref 74–99)
GLUCOSE BLD MANUAL STRIP-MCNC: 223 MG/DL (ref 74–99)
GLUCOSE SERPL-MCNC: 115 MG/DL (ref 74–99)
MAGNESIUM SERPL-MCNC: 1.93 MG/DL (ref 1.6–2.4)
POTASSIUM SERPL-SCNC: 3.5 MMOL/L (ref 3.5–5.3)
SODIUM SERPL-SCNC: 138 MMOL/L (ref 136–145)

## 2024-01-26 PROCEDURE — 2500000004 HC RX 250 GENERAL PHARMACY W/ HCPCS (ALT 636 FOR OP/ED)

## 2024-01-26 PROCEDURE — 2500000001 HC RX 250 WO HCPCS SELF ADMINISTERED DRUGS (ALT 637 FOR MEDICARE OP): Performed by: INTERNAL MEDICINE

## 2024-01-26 PROCEDURE — 2500000002 HC RX 250 W HCPCS SELF ADMINISTERED DRUGS (ALT 637 FOR MEDICARE OP, ALT 636 FOR OP/ED): Performed by: NURSE PRACTITIONER

## 2024-01-26 PROCEDURE — 86140 C-REACTIVE PROTEIN: CPT | Performed by: INTERNAL MEDICINE

## 2024-01-26 PROCEDURE — 71046 X-RAY EXAM CHEST 2 VIEWS: CPT | Performed by: RADIOLOGY

## 2024-01-26 PROCEDURE — 99238 HOSP IP/OBS DSCHRG MGMT 30/<: CPT | Performed by: INTERNAL MEDICINE

## 2024-01-26 PROCEDURE — 82947 ASSAY GLUCOSE BLOOD QUANT: CPT

## 2024-01-26 PROCEDURE — 83735 ASSAY OF MAGNESIUM: CPT | Performed by: INTERNAL MEDICINE

## 2024-01-26 PROCEDURE — 36415 COLL VENOUS BLD VENIPUNCTURE: CPT | Performed by: INTERNAL MEDICINE

## 2024-01-26 PROCEDURE — 71046 X-RAY EXAM CHEST 2 VIEWS: CPT

## 2024-01-26 PROCEDURE — 94640 AIRWAY INHALATION TREATMENT: CPT

## 2024-01-26 PROCEDURE — 2500000004 HC RX 250 GENERAL PHARMACY W/ HCPCS (ALT 636 FOR OP/ED): Performed by: NURSE PRACTITIONER

## 2024-01-26 PROCEDURE — 2500000002 HC RX 250 W HCPCS SELF ADMINISTERED DRUGS (ALT 637 FOR MEDICARE OP, ALT 636 FOR OP/ED): Performed by: INTERNAL MEDICINE

## 2024-01-26 PROCEDURE — 80048 BASIC METABOLIC PNL TOTAL CA: CPT | Performed by: INTERNAL MEDICINE

## 2024-01-26 PROCEDURE — 2500000001 HC RX 250 WO HCPCS SELF ADMINISTERED DRUGS (ALT 637 FOR MEDICARE OP): Performed by: NURSE PRACTITIONER

## 2024-01-26 RX ORDER — METFORMIN HYDROCHLORIDE 500 MG/1
500 TABLET ORAL
Qty: 60 TABLET | Refills: 0 | Status: SHIPPED | OUTPATIENT
Start: 2024-01-26 | End: 2024-01-28

## 2024-01-26 RX ORDER — FUROSEMIDE 40 MG/1
40 TABLET ORAL DAILY
Qty: 30 TABLET | Refills: 0 | Status: SHIPPED | OUTPATIENT
Start: 2024-01-26 | End: 2024-01-28

## 2024-01-26 RX ORDER — LOSARTAN POTASSIUM 50 MG/1
50 TABLET ORAL DAILY
Qty: 30 TABLET | Refills: 0 | Status: SHIPPED | OUTPATIENT
Start: 2024-01-27 | End: 2024-01-28

## 2024-01-26 RX ORDER — SPIRONOLACTONE 25 MG/1
25 TABLET ORAL DAILY
Qty: 30 TABLET | Refills: 1 | Status: SHIPPED | OUTPATIENT
Start: 2024-01-27

## 2024-01-26 RX ORDER — ALBUTEROL SULFATE 90 UG/1
2 AEROSOL, METERED RESPIRATORY (INHALATION)
Qty: 8 G | Refills: 0 | Status: SHIPPED | OUTPATIENT
Start: 2024-01-26 | End: 2024-01-28

## 2024-01-26 RX ORDER — AZITHROMYCIN 250 MG/1
250 TABLET, FILM COATED ORAL
Qty: 3 TABLET | Refills: 0 | Status: SHIPPED | OUTPATIENT
Start: 2024-01-27 | End: 2024-03-06 | Stop reason: ALTCHOICE

## 2024-01-26 RX ORDER — POTASSIUM CHLORIDE 20 MEQ/1
20 TABLET, EXTENDED RELEASE ORAL DAILY
Qty: 30 TABLET | Refills: 0 | Status: SHIPPED | OUTPATIENT
Start: 2024-01-26 | End: 2024-01-28

## 2024-01-26 RX ORDER — DAPAGLIFLOZIN 10 MG/1
10 TABLET, FILM COATED ORAL DAILY
Qty: 30 TABLET | Refills: 0 | Status: SHIPPED | OUTPATIENT
Start: 2024-01-26 | End: 2024-03-08

## 2024-01-26 RX ORDER — OSELTAMIVIR PHOSPHATE 75 MG/1
75 CAPSULE ORAL 2 TIMES DAILY
Qty: 5 CAPSULE | Refills: 0 | Status: SHIPPED | OUTPATIENT
Start: 2024-01-26 | End: 2024-01-28

## 2024-01-26 RX ADMIN — GUAIFENESIN 1200 MG: 600 TABLET ORAL at 08:45

## 2024-01-26 RX ADMIN — DOCUSATE SODIUM 100 MG: 100 CAPSULE, LIQUID FILLED ORAL at 08:45

## 2024-01-26 RX ADMIN — CITALOPRAM HYDROBROMIDE 20 MG: 20 TABLET ORAL at 08:45

## 2024-01-26 RX ADMIN — METOPROLOL SUCCINATE 25 MG: 25 TABLET, EXTENDED RELEASE ORAL at 08:45

## 2024-01-26 RX ADMIN — INSULIN LISPRO 4 UNITS: 100 INJECTION, SOLUTION INTRAVENOUS; SUBCUTANEOUS at 11:28

## 2024-01-26 RX ADMIN — OSELTAMIVIR PHOSPHATE 75 MG: 75 CAPSULE ORAL at 08:45

## 2024-01-26 RX ADMIN — AZITHROMYCIN MONOHYDRATE 250 MG: 250 TABLET ORAL at 08:45

## 2024-01-26 RX ADMIN — CLOPIDOGREL 75 MG: 75 TABLET ORAL at 08:45

## 2024-01-26 RX ADMIN — LOSARTAN POTASSIUM 50 MG: 50 TABLET, FILM COATED ORAL at 08:45

## 2024-01-26 RX ADMIN — ASPIRIN 81 MG: 81 TABLET, COATED ORAL at 08:45

## 2024-01-26 RX ADMIN — AMLODIPINE BESYLATE 10 MG: 10 TABLET ORAL at 08:45

## 2024-01-26 RX ADMIN — SPIRONOLACTONE 25 MG: 25 TABLET ORAL at 08:45

## 2024-01-26 RX ADMIN — FUROSEMIDE 20 MG: 10 INJECTION, SOLUTION INTRAMUSCULAR; INTRAVENOUS at 08:45

## 2024-01-26 RX ADMIN — ENOXAPARIN SODIUM 40 MG: 40 INJECTION SUBCUTANEOUS at 08:45

## 2024-01-26 RX ADMIN — IPRATROPIUM BROMIDE AND ALBUTEROL SULFATE 3 ML: 2.5; .5 SOLUTION RESPIRATORY (INHALATION) at 08:44

## 2024-01-26 RX ADMIN — Medication 1 CAPSULE: at 08:51

## 2024-01-26 ASSESSMENT — COGNITIVE AND FUNCTIONAL STATUS - GENERAL
MOBILITY SCORE: 24
DAILY ACTIVITIY SCORE: 24

## 2024-01-26 ASSESSMENT — PAIN - FUNCTIONAL ASSESSMENT: PAIN_FUNCTIONAL_ASSESSMENT: 0-10

## 2024-01-26 ASSESSMENT — PAIN SCALES - GENERAL
PAINLEVEL_OUTOF10: 0 - NO PAIN
PAINLEVEL_OUTOF10: 0 - NO PAIN

## 2024-01-26 ASSESSMENT — ACTIVITIES OF DAILY LIVING (ADL): LACK_OF_TRANSPORTATION: NO

## 2024-01-26 NOTE — PROGRESS NOTES
"Mitch Olivarez is a 63 y.o. male on day 2 of admission presenting with Pneumonia and influenza.    Subjective   Appears comfortable this morning. No longer requiring oxygen.     Objective     Physical Exam  Vitals reviewed.   Constitutional:       General: He is not in acute distress.  HENT:      Head: Normocephalic and atraumatic.   Eyes:      Extraocular Movements: Extraocular movements intact.      Conjunctiva/sclera: Conjunctivae normal.      Pupils: Pupils are equal, round, and reactive to light.   Cardiovascular:      Rate and Rhythm: Normal rate and regular rhythm.      Pulses: Normal pulses.      Heart sounds: Normal heart sounds. No murmur heard.     No gallop.   Pulmonary:      Effort: Pulmonary effort is normal. No respiratory distress.      Breath sounds: Normal breath sounds.   Abdominal:      General: Abdomen is flat. There is no distension.      Palpations: Abdomen is soft.      Tenderness: There is no abdominal tenderness.   Musculoskeletal:      Right lower leg: No edema.      Left lower leg: No edema.   Skin:     General: Skin is warm and dry.   Neurological:      Mental Status: He is alert. Mental status is at baseline.   Psychiatric:         Mood and Affect: Mood normal.         Behavior: Behavior normal.       Last Recorded Vitals  Blood pressure 124/71, pulse 52, temperature 37.3 °C (99.1 °F), resp. rate 18, height 1.854 m (6' 1\"), weight 137 kg (301 lb 8 oz), SpO2 95 %.  Intake/Output last 3 Shifts:  I/O last 3 completed shifts:  In: 2220 (16.2 mL/kg) [P.O.:1920; IV Piggyback:300]  Out: 2675 (19.6 mL/kg) [Urine:2675 (0.5 mL/kg/hr)]  Weight: 136.8 kg     Relevant Results  Results for orders placed or performed during the hospital encounter of 01/24/24 (from the past 24 hour(s))   POCT GLUCOSE   Result Value Ref Range    POCT Glucose 102 (H) 74 - 99 mg/dL   POCT GLUCOSE   Result Value Ref Range    POCT Glucose 280 (H) 74 - 99 mg/dL   Basic Metabolic Panel   Result Value Ref Range    Glucose 115 " (H) 74 - 99 mg/dL    Sodium 138 136 - 145 mmol/L    Potassium 3.5 3.5 - 5.3 mmol/L    Chloride 104 98 - 107 mmol/L    Bicarbonate 24 21 - 32 mmol/L    Anion Gap 14 10 - 20 mmol/L    Urea Nitrogen 22 6 - 23 mg/dL    Creatinine 0.91 0.50 - 1.30 mg/dL    eGFR >90 >60 mL/min/1.73m*2    Calcium 8.4 (L) 8.6 - 10.3 mg/dL   Magnesium   Result Value Ref Range    Magnesium 1.93 1.60 - 2.40 mg/dL   C-reactive protein   Result Value Ref Range    C-Reactive Protein 0.85 <1.00 mg/dL   POCT GLUCOSE   Result Value Ref Range    POCT Glucose 115 (H) 74 - 99 mg/dL   POCT GLUCOSE   Result Value Ref Range    POCT Glucose 223 (H) 74 - 99 mg/dL       Assessment/Plan   Mitch Olivarez is a 62 yo male with a PMH of CAD s/p STEMI (July 2021) w/ PCI to RCA who appears to have been lost to cardiac follow up since August 2021. Presented with acute respiratory failure, multifocal pneumonia. Cardiology consulted d/t concern for heart failure.      Acute hypoxic respiratory failure 2/2 pneumonia vs CHF exacerbation  Acute on chronic heart failure (presumed diastolic)  CAD s/p PCI to RCA in setting of STEMI (7/2021)  -ECHO completed - estimated EF at 44%      1. Left ventricular systolic function is mildly decreased.      2. There is global hypokinesis of the left ventricle with minor regional variations.      3. Spectral Doppler shows an abnormal pattern of left ventricular diastolic filling.     Recommendations:  -transition to 40mg PO lasix daily  -cont. spironolactone 25mg daily   -consider adding Jardiance 10mg on discharge if medication is not cost prohibitive  -strict I/Os, daily weights  -cont. ASA, Plavix, atorvastatin, losartan, Toprol per home dosing  -cont. treatment of pneumonia per primary team  -follow up with cardiology in [place] with Mirella Gill CNP on Feb 21 at 10:40am , consider starting Entresto on follow up appointment after 72hr washout window with losartan      Cardiology will sign off. Please reach out with further  questions.      Boris Singh, DO  Internal Medicine, PGY-I

## 2024-01-26 NOTE — CARE PLAN
The patient's goals for the shift include  get some rest tonight     The clinical goals for the shift include pt will continue on 2L to decrease shortness of breath during this shift.    Over the shift, the patient did

## 2024-01-26 NOTE — PROGRESS NOTES
01/26/24 1305   Discharge Planning   Living Arrangements Children;Family members   Support Systems Children;Family members   Assistance Needed Alert and oriented x 3, Independent with ADL's, Drives, No DME used   Type of Residence Private residence   Number of Stairs to Enter Residence 2   Number of Stairs Within Residence 13   Who is requesting discharge planning? Provider   Home or Post Acute Services None   Patient expects to be discharged to: Home with daughter and grandkids, with no discharge needs identified, patient was weaned off of O2   Does the patient need discharge transport arranged? No   Financial Resource Strain   How hard is it for you to pay for the very basics like food, housing, medical care, and heating? Not hard   Housing Stability   In the last 12 months, how many places have you lived? 1   In the last 12 months, was there a time when you did not have a steady place to sleep or slept in a shelter (including now)? N   Transportation Needs   In the past 12 months, has lack of transportation kept you from medical appointments or from getting medications? no   In the past 12 months, has lack of transportation kept you from meetings, work, or from getting things needed for daily living? No   Patient Choice   Provider Choice list and CMS website (https://medicare.gov/care-compare#search) for post-acute Quality and Resource Measure Data were provided and reviewed with: Patient   Patient / Family choosing to utilize agency / facility established prior to hospitalization No     1/26/24: 13:22 LE Met with the patient at the bedside on 1/24/24 to discuss discharge planning. The patient is from home and his daughter and grandchildren live with the patient. Patient is A&O x 3,  independent with ADL's, and uses no assistive devices. Patient drives. Patient has been requiring O2 at 2 liters but has been weaned to RA. Patient denies any discharge needs at this time. TCC to follow.

## 2024-01-26 NOTE — PROGRESS NOTES
Mitch Olivarez is a 63 y.o. male on day 2 of admission presenting with Pneumonia and influenza.    Subjective   Interval History: no fever, less sob and cough        Review of Systems    Objective   Range of Vitals (last 24 hours)  Heart Rate:  [52-73]   Temp:  [36.4 °C (97.5 °F)-37.3 °C (99.1 °F)]   Resp:  [18-20]   BP: (124-154)/(71-79)   SpO2:  [92 %-96 %]   Daily Weight  01/24/24 : 137 kg (301 lb 8 oz)    Body mass index is 39.78 kg/m².    Physical Exam  Constitutional:       Appearance: Normal appearance.   HENT:      Head: Normocephalic and atraumatic.      Mouth/Throat:      Mouth: Mucous membranes are moist.      Pharynx: Oropharynx is clear.   Eyes:      Pupils: Pupils are equal, round, and reactive to light.   Cardiovascular:      Rate and Rhythm: Normal rate and regular rhythm.      Heart sounds: Normal heart sounds.   Pulmonary:      Effort: Pulmonary effort is normal.      Breath sounds: Normal breath sounds.   Abdominal:      General: Abdomen is flat. Bowel sounds are normal.      Palpations: Abdomen is soft.   Musculoskeletal:      Cervical back: Normal range of motion.   Neurological:      Mental Status: He is alert.         Antibiotics  amLODIPine (Norvasc) tablet 10 mg  aspirin EC tablet 81 mg  atorvastatin (Lipitor) tablet 80 mg  citalopram (CeleXA) tablet 20 mg  clopidogrel (Plavix) tablet 75 mg  losartan (Cozaar) tablet 25 mg  metoprolol succinate XL (Toprol-XL) 24 hr tablet 25 mg  cefTRIAXone (Rocephin) IVPB 1 g  azithromycin (Zithromax) in dextrose 5 % in water (D5W) 250 mL  mg  enoxaparin (Lovenox) syringe 40 mg  sodium chloride 0.9% infusion  acetaminophen (Tylenol) tablet 650 mg  ondansetron (Zofran) tablet 4 mg  ondansetron (Zofran) injection 4 mg  melatonin tablet 3 mg  docusate sodium (Colace) capsule 100 mg  oseltamivir (Tamiflu) capsule 75 mg  dextrose 50 % injection 25 g  glucagon (Glucagen) injection 1 mg  dextrose 10 % in water (D10W) infusion  insulin lispro (HumaLOG)  injection 0-10 Units  perflutren lipid microspheres (Definity) injection 0.5-10 mL of dilution  sulfur hexafluoride microsphr (Lumason) injection 24.28 mg  perflutren protein A microsphere (Optison) injection 0.5 mL  hydrALAZINE (Apresoline) injection 10 mg  furosemide (Lasix) injection 20 mg  oxygen (O2) therapy  guaiFENesin (Mucinex) 12 hr tablet 1,200 mg  ipratropium-albuteroL (Duo-Neb) 0.5-2.5 mg/3 mL nebulizer solution 3 mL  albuterol 2.5 mg /3 mL (0.083 %) nebulizer solution 2.5 mg  losartan (Cozaar) tablet 50 mg  lactobacillus acidophilus capsule 1 capsule  ipratropium-albuteroL (Duo-Neb) 0.5-2.5 mg/3 mL nebulizer solution 3 mL  albuterol 2.5 mg /3 mL (0.083 %) nebulizer solution 2.5 mg  hydrALAZINE (Apresoline) injection 10 mg  potassium chloride (Klor-Con) packet 40 mEq  melatonin tablet 6 mg  azithromycin (Zithromax) tablet 250 mg  potassium chloride CR (Klor-Con M20) ER tablet 40 mEq      Relevant Results  Labs  Results from last 72 hours   Lab Units 01/24/24  0638   WBC AUTO x10*3/uL 4.9   HEMOGLOBIN g/dL 14.6   HEMATOCRIT % 44.3   PLATELETS AUTO x10*3/uL 134*       Results from last 72 hours   Lab Units 01/26/24  0623 01/25/24  0637 01/24/24  0637   SODIUM mmol/L 138 139 137   POTASSIUM mmol/L 3.5 3.3* 3.5   CHLORIDE mmol/L 104 102 102   CO2 mmol/L 24 27 26   BUN mg/dL 22 19 22   CREATININE mg/dL 0.91 0.93 0.94   GLUCOSE mg/dL 115* 116* 111*   CALCIUM mg/dL 8.4* 8.5* 8.4*   ANION GAP mmol/L 14 13 13   EGFR mL/min/1.73m*2 >90 >90 90             Estimated Creatinine Clearance: 117.9 mL/min (by C-G formula based on SCr of 0.91 mg/dL).  C-Reactive Protein   Date Value Ref Range Status   01/26/2024 0.85 <1.00 mg/dL Final     Microbiology  Reviewed  Imaging  reviewed        Assessment/Plan     Respiratory failure / likely COPD / influenza A / pneumonia, procalcitonin 0.18     Recommendations :  Tamiflu protocol  Continue Azithromycin  O2 weaning     I spent minutes in the professional and overall care of this  patient.      Dallas Nam MD

## 2024-01-26 NOTE — DISCHARGE SUMMARY
Discharge Diagnosis  Pneumonia and influenza    Issues Requiring Follow-Up  New meds, follow up for infection    Discharge Meds     Your medication list        START taking these medications        Instructions Last Dose Given Next Dose Due   albuterol 90 mcg/actuation inhaler  Commonly known as: Ventolin HFA      Inhale 2 puffs 4 times a day.       azithromycin 250 mg tablet  Commonly known as: Zithromax  Start taking on: January 27, 2024      Take 1 tablet (250 mg) by mouth once every 24 hours. Do not start before January 27, 2024.       dapagliflozin propanediol 10 mg  Commonly known as: Farxiga      Take 1 tablet (10 mg) by mouth once daily.       furosemide 40 mg tablet  Commonly known as: Lasix      Take 1 tablet (40 mg) by mouth once daily.       oseltamivir 75 mg capsule  Commonly known as: Tamiflu      Take 1 capsule (75 mg) by mouth 2 times a day for 5 doses.       potassium chloride CR 20 mEq ER tablet  Commonly known as: Klor-Con M20      Take 1 tablet (20 mEq) by mouth once daily. Do not crush or chew.       spironolactone 25 mg tablet  Commonly known as: Aldactone  Start taking on: January 27, 2024      Take 1 tablet (25 mg) by mouth once daily. Do not start before January 27, 2024.              CHANGE how you take these medications        Instructions Last Dose Given Next Dose Due   losartan 50 mg tablet  Commonly known as: Cozaar  Start taking on: January 27, 2024  What changed:   medication strength  how much to take      Take 1 tablet (50 mg) by mouth once daily. Do not start before January 27, 2024.              CONTINUE taking these medications        Instructions Last Dose Given Next Dose Due   amLODIPine 10 mg tablet  Commonly known as: Norvasc           aspirin 81 mg EC tablet           atorvastatin 80 mg tablet  Commonly known as: Lipitor           citalopram 20 mg tablet  Commonly known as: CeleXA           clopidogrel 75 mg tablet  Commonly known as: Plavix           metFORMIN 500 mg  tablet  Commonly known as: Glucophage      Take 1 tablet (500 mg) by mouth 2 times a day with meals.       metoprolol succinate XL 25 mg 24 hr tablet  Commonly known as: Toprol-XL                     Where to Get Your Medications        These medications were sent to [id number] - [place], [place] - [address]  [address], [facility] [address]      Phone: [phone]   albuterol 90 mcg/actuation inhaler  azithromycin 250 mg tablet  dapagliflozin propanediol 10 mg  furosemide 40 mg tablet  losartan 50 mg tablet  metFORMIN 500 mg tablet  oseltamivir 75 mg capsule  potassium chloride CR 20 mEq ER tablet  spironolactone 25 mg tablet         Test Results Pending At Discharge  Pending Labs       No current pending labs.            Hospital Course   Mr Olivarez presented to hospital feeling miserable. He was coughing, short of breath, weak, poor appetite, and found to be Influenza positive and have acute hypoxic respiratory failure, with an initial sat of 84% on room air. He was placed on tamiflu and also antibiotics to cover a secondary infection. Blood pressure was also quite elevated on presentation, trop was up. Cards was consulted, treated patient for chf as well, and he did diurese over 2.5 liters. He was weaned from oxygen. Symptoms improved and by today he is requesting that he be discharged home. Plan is to start him on Entresto as an outpatient, after he has a chance to recover from the flu. His med regimen has been adjusted, including the addition of jardiance, increasing his cozaar and adding lasix and aldactone. He should have a bmp in a few days to monitor his K and renal function. He is stable for dc, will complete his tamiflu and zithro at home. [facility] will contact him and make appointment for him in the office    Pertinent Physical Exam At Time of Discharge  Physical Exam  Constitutional:       Appearance: Normal appearance.   HENT:      Head: Normocephalic.      Nose: Nose normal.       Mouth/Throat:      Mouth: Mucous membranes are moist.   Eyes:      Extraocular Movements: Extraocular movements intact.      Pupils: Pupils are equal, round, and reactive to light.   Neck:      Comments: No jvd  Cardiovascular:      Rate and Rhythm: Normal rate and regular rhythm.      Pulses: Normal pulses.   Pulmonary:      Comments: Normal effort.   Much improved air exchange, no real rhonchi or wheezing today, few inspiratory crackles.   Abdominal:      General: Bowel sounds are normal.      Palpations: Abdomen is soft.   Musculoskeletal:         General: Normal range of motion.   Skin:     General: Skin is warm and dry.   Neurological:      General: No focal deficit present.      Mental Status: He is alert and oriented to person, place, and time.   Psychiatric:         Mood and Affect: Mood normal.         Behavior: Behavior normal.         Outpatient Follow-Up  Future Appointments   Date Time Provider Department Center   2/21/2024 10:40 AM ALEX Franco-CNP [id number]         Lorie Arredondo MD

## 2024-01-27 LAB
BACTERIA BLD CULT: NORMAL
BACTERIA BLD CULT: NORMAL

## 2024-01-27 NOTE — PROGRESS NOTES
Emergency Medicine Transition of Care Note.    I received Mitch Olivarez in signout from   Dr. Melgar please see the previous ED provider note for all HPI, PE and MDM up to the time of signout at 2000. This is in addition to the primary record.    In brief Mitch Olivarez is an 63 y.o. male presenting for   Chief Complaint   Patient presents with    Fatigue     At the time of signout we were awaiting: Transfer    Diagnoses as of 01/27/24 0554   Chest pain, unspecified type   Chest pain due to myocardial ischemia, unspecified ischemic chest pain type   History of coronary artery stent placement   Bacterial pneumonia   Influenza A H1N1 infection       Medical Decision Making  Patient with chest pain finally obtained a bed at [facility] and was transferred there.  He had no further interventions required before transfer.  His pressure was 160/72 with a heart rate of 63 and a sat of 96%.        Final diagnoses:   [R07.9] Chest pain, unspecified type   [I25.9] Chest pain due to myocardial ischemia, unspecified ischemic chest pain type   [Z95.5] History of coronary artery stent placement   [J15.9] Bacterial pneumonia   [J10.1] Influenza A H1N1 infection           Procedure  Procedures    Rogelio Berkowitz MD

## 2024-01-28 DIAGNOSIS — E11.9 TYPE 2 DIABETES MELLITUS WITHOUT COMPLICATION, WITHOUT LONG-TERM CURRENT USE OF INSULIN (MULTI): ICD-10-CM

## 2024-01-28 DIAGNOSIS — J10.1 INFLUENZA A H1N1 INFECTION: ICD-10-CM

## 2024-01-28 DIAGNOSIS — I50.9 HEART FAILURE, UNSPECIFIED (MULTI): ICD-10-CM

## 2024-01-28 RX ORDER — LOSARTAN POTASSIUM 50 MG/1
50 TABLET ORAL DAILY
Qty: 30 TABLET | Refills: 0 | Status: SHIPPED | OUTPATIENT
Start: 2024-01-28

## 2024-01-28 RX ORDER — METFORMIN HYDROCHLORIDE 500 MG/1
500 TABLET ORAL
Qty: 60 TABLET | Refills: 0 | Status: SHIPPED | OUTPATIENT
Start: 2024-01-28

## 2024-01-28 RX ORDER — FUROSEMIDE 40 MG/1
40 TABLET ORAL DAILY
Qty: 30 TABLET | Refills: 0 | Status: SHIPPED | OUTPATIENT
Start: 2024-01-28

## 2024-01-28 RX ORDER — ALBUTEROL SULFATE 90 UG/1
2 AEROSOL, METERED RESPIRATORY (INHALATION) 4 TIMES DAILY PRN
Qty: 8.5 G | Refills: 0 | Status: SHIPPED | OUTPATIENT
Start: 2024-01-28

## 2024-01-28 RX ORDER — POTASSIUM CHLORIDE 20 MEQ/1
20 TABLET, EXTENDED RELEASE ORAL DAILY
Qty: 30 TABLET | Refills: 0 | Status: SHIPPED | OUTPATIENT
Start: 2024-01-28

## 2024-01-28 RX ORDER — OSELTAMIVIR PHOSPHATE 75 MG/1
75 CAPSULE ORAL 2 TIMES DAILY
Qty: 5 CAPSULE | Refills: 0 | Status: SHIPPED | OUTPATIENT
Start: 2024-01-28 | End: 2024-02-06

## 2024-01-28 NOTE — PROGRESS NOTES
Emergency Medicine Transition of Care Note.    I received Mitch Olivarez in signout from   Katerina.Please see the previous ED provider note for all HPI, PE and MDM up to the time of signout at 2000. This is in addition to the primary record.    In brief Mitch Olivarez is an 63 y.o. male presenting for   Chief Complaint   Patient presents with    Fatigue     At the time of signout we were awaiting: transfer    Diagnoses as of 01/27/24 1941   Chest pain, unspecified type   Chest pain due to myocardial ischemia, unspecified ischemic chest pain type   History of coronary artery stent placement   Bacterial pneumonia   Influenza A H1N1 infection       Medical Decision Making  Received the patient in stable condition from Dr. Melgar.  Currently were waiting on transfer to [facility].  Patient was found to have influenza A but is outside the window for treatment.  Also has bacterial pneumonia and slightly elevated troponin.  He is already had his dose of antibiotics and has had no chest pain requiring any kind of intervention.  He has remained stable and was transferred without any incident.        Final diagnoses:   [R07.9] Chest pain, unspecified type   [I25.9] Chest pain due to myocardial ischemia, unspecified ischemic chest pain type   [Z95.5] History of coronary artery stent placement   [J15.9] Bacterial pneumonia   [J10.1] Influenza A H1N1 infection           Procedure  Procedures    Rogelio Berkowitz MD

## 2024-01-28 NOTE — PROGRESS NOTES
Emergency Medicine Transition of Care Note.    I received Mitch Olivarez in signout from   Dr. Melgar. Please see the previous ED provider note for all HPI, PE and MDM up to the time of signout at 2000. This is in addition to the primary record.    In brief Mitch Olivarez is an 63 y.o. male presenting for   Chief Complaint   Patient presents with    Fatigue     At the time of signout we were awaiting: transfer    Diagnoses as of 01/27/24 1951   Chest pain, unspecified type   Chest pain due to myocardial ischemia, unspecified ischemic chest pain type   History of coronary artery stent placement   Bacterial pneumonia   Influenza A H1N1 infection       Medical Decision Making  Patient was handed off to me in stable condition by Dr. Melgar.  Currently waiting on transfer to [facility].  Patient is remained stable during his stay at [facility].  He was found to have a slightly elevated troponin as well as pneumonia and influenza A.  Unfortunately, he was outside the window of treatment for influenza but he did receive IV fluids and loading dose of antibiotics for community-acquired pneumonia.  He remained stable and was transferred without any incident.        Final diagnoses:   [R07.9] Chest pain, unspecified type   [I25.9] Chest pain due to myocardial ischemia, unspecified ischemic chest pain type   [Z95.5] History of coronary artery stent placement   [J15.9] Bacterial pneumonia   [J10.1] Influenza A H1N1 infection           Procedure  Procedures    Rogelio Berkowitz MD

## 2024-01-29 NOTE — SIGNIFICANT EVENT
Follow Up Phone Call    Outgoing phone call    Spoke to: Mitch Olivarez Relationship:self   Phone number: [phone]      Outcome: I left a message on answering machine   No chief complaint on file.         Diagnosis:Not applicable

## 2024-01-29 NOTE — PROGRESS NOTES
Emergency Medicine Transition of Care Note.    I received Mitch Olivarez in signout from  Katerina Please see the previous ED provider note for all HPI, PE and MDM up to the time of signout at 2000. This is in addition to the primary record.    In brief Mitch Olivarez is an 63 y.o. male presenting for   Chief Complaint   Patient presents with    Fatigue     At the time of signout we were awaiting: Transfer    Diagnoses as of 01/29/24 0557   Chest pain, unspecified type   Chest pain due to myocardial ischemia, unspecified ischemic chest pain type   History of coronary artery stent placement   Bacterial pneumonia   Influenza A H1N1 infection       Medical Decision Making  Patient was handed off to me by Dr. Melgar in stable condition.  We are currently waiting on transfer to another facility.  No interventions were required during my shift.        Final diagnoses:   [R07.9] Chest pain, unspecified type   [I25.9] Chest pain due to myocardial ischemia, unspecified ischemic chest pain type   [Z95.5] History of coronary artery stent placement   [J15.9] Bacterial pneumonia   [J10.1] Influenza A H1N1 infection           Procedure  Procedures    Rogelio Berkowitz MD

## 2024-01-31 DIAGNOSIS — J10.1 INFLUENZA A H1N1 INFECTION: ICD-10-CM

## 2024-02-06 RX ORDER — OSELTAMIVIR PHOSPHATE 75 MG/1
75 CAPSULE ORAL 2 TIMES DAILY
Qty: 5 CAPSULE | Refills: 0 | Status: SHIPPED | OUTPATIENT
Start: 2024-02-06 | End: 2024-02-20

## 2024-02-14 DIAGNOSIS — J10.1 INFLUENZA A H1N1 INFECTION: ICD-10-CM

## 2024-02-20 RX ORDER — OSELTAMIVIR PHOSPHATE 75 MG/1
75 CAPSULE ORAL 2 TIMES DAILY
Qty: 5 CAPSULE | Refills: 0 | Status: SHIPPED | OUTPATIENT
Start: 2024-02-20 | End: 2024-02-23

## 2024-02-21 ENCOUNTER — APPOINTMENT (OUTPATIENT)
Dept: CARDIOLOGY | Facility: CLINIC | Age: 64
End: 2024-02-21
Payer: MEDICARE

## 2024-03-05 PROBLEM — I21.3 STEMI (ST ELEVATION MYOCARDIAL INFARCTION) (MULTI): Status: ACTIVE | Noted: 2024-03-05

## 2024-03-05 PROBLEM — F17.200 TOBACCO USE DISORDER: Status: ACTIVE | Noted: 2020-08-25

## 2024-03-05 PROBLEM — I10 BENIGN ESSENTIAL HYPERTENSION: Status: ACTIVE | Noted: 2024-03-05

## 2024-03-05 PROBLEM — I25.10 ARTERIOSCLEROSIS OF CORONARY ARTERY: Status: ACTIVE | Noted: 2024-03-05

## 2024-03-05 PROBLEM — E78.2 HYPERLIPIDEMIA, MIXED: Status: ACTIVE | Noted: 2020-08-25

## 2024-03-05 RX ORDER — SILDENAFIL 100 MG/1
1 TABLET, FILM COATED ORAL AS NEEDED
COMMUNITY
Start: 2018-03-14

## 2024-03-05 NOTE — PROGRESS NOTES
Primary Care Physician: Kunal Poole DO  Primary Cardiologist:      Date of Visit: 03/06/2024 11:20 AM EST  Location of visit:  [address]   Type of Visit: New Patient        Chief Complaint   Patient presents with    Follow-up    Hospital Follow-up     Some sob with exertion       HPI / Summary:   Mitch Olivarez is a 63 y.o. male with history of depression, HTN and CAD s/p STEMI 7/23/2021 Prox LAD involvement s/p thrombectomy with subsequent PCI x2 to mid-RCA . Recently admitted to [facility] with Acute on chronic diastolic heart failure and multifocal pneumonia. LVEF 44%, HFmrEF, NYHA II presents for hospital follow up. Lost to routine follow up after previous appointment 12/2021.     Overall doing well from CV perspective. Note he continues to have some swelling in his toes but feels it continues to improve. Has hopes of coming off/down sizing his medication list. Unfortunately continues to smoke, attempted to Quit smoking x1 month then went back to it. Does not smoke at all during work it is when he comes starts smoking again. Does not report and substantial SOB or limiting exertional symptoms.     12 system review is negative except as noted above      Medical History:   Past Medical History:   Diagnosis Date    CHF (congestive heart failure) (CMS/[facility])     Depression, unspecified 07/19/2019    Depression    Diabetes mellitus (CMS/[facility])     ED (erectile dysfunction)     Heart disease     Hyperlipemia     Hypertension     Obesity     Personal history of other diseases of the respiratory system 03/21/2017    History of acute sinusitis    Personal history of other diseases of the respiratory system 03/21/2017    History of acute sinusitis    Personal history of other specified conditions 10/10/2014    History of dysuria       Surgical History:   Past Surgical History:   Procedure Laterality Date    ANKLE SURGERY  04/20/2015    Ankle Surgery    CARDIAC SURGERY      two stents placed    TONSILLECTOMY  04/20/2015     Tonsillectomy    VASCULAR SURGERY         Family History:   Family History   Problem Relation Name Age of Onset    Other (htn) Father         Social History:   Tobacco Use: Medium Risk (3/6/2024)    Patient History     Smoking Tobacco Use: Former     Smokeless Tobacco Use: Unknown     Passive Exposure: Not on file             MEDICATIONS:   Current Outpatient Medications   Medication Instructions    albuterol 90 mcg/actuation inhaler 2 puffs, 4 times daily PRN    amLODIPine (NORVASC) 10 mg, oral, Daily    aspirin 81 mg, oral, Daily    atorvastatin (LIPITOR) 80 mg, oral, Nightly    citalopram (CELEXA) 20 mg, oral, Daily    clopidogrel (PLAVIX) 75 mg, oral, Daily    dapagliflozin propanediol (FARXIGA) 10 mg, oral, Daily    furosemide (LASIX) 40 mg, oral, Daily    losartan (COZAAR) 50 mg, oral, Daily    metFORMIN (GLUCOPHAGE) 500 mg, oral, 2 times daily with meals    metoprolol succinate XL (TOPROL-XL) 25 mg, oral, Daily, Do not crush or chew.    potassium chloride CR 20 mEq ER tablet 20 mEq, oral, Daily, Do not crush or chew    sildenafil (Viagra) 100 mg tablet 1 tablet, oral, As needed    spironolactone (ALDACTONE) 25 mg, oral, Daily         IMAGING REVIEWED:   Echocardiogram:   Echocardiogram     [facility], [address] [place] [address]  Tel [phone] and Fax [phone]    TRANSTHORACIC ECHOCARDIOGRAM REPORT      Patient Name:     MGIUEL ENRIQUEZ      Reading Physician:  [id number] Dayne Santana MD  Study Date:       7/26/2021           Referring           Jay Mix MD  Physician:  MRN/PID:          [id number]            PCP:  Accession/Order#: [id number]           Department          [facility] [place] [facility]  Location:  YOB: 1960          Fellow:  Gender:           M                   Nurse:  Admit Date:       7/23/2021           Sonographer:        Lynne Grijalva [facility]  Admission Status: Observation -       Additional Staff:  Routine  Height:           182.88 cm           CC  Report to:       ICU  Josh  Weight:           123.83 kg           Study Type:         Echocardiogram  BSA:              2.43 m2  Blood Pressure: 124 /79 mmHg    Diagnosis/ICD: I21.3-ST elevation (STEMI) myocardial infarction of unspecified  site  Indication:    Acute Coronary Syndrome: STEMI  Procedure/CPT: Echo Complete w Full Doppler-71550    Patient History:  Smoker:            Current.  Pertinent History: Chest Pain, LE Edema and HTN. s/p PCI, elevated troponin.    Study Detail: The following Echo studies were performed: 2D, M-Mode, Doppler and  color flow.      PHYSICIAN INTERPRETATION:  Left Ventricle: The left ventricular systolic function is low normal, with an estimated ejection fraction of 55%. There are no regional wall motion abnormalities. The left ventricular cavity size is normal. There is moderate concentric left ventricular hypertrophy. Spectral Doppler shows an impaired relaxation pattern of left ventricular diastolic filling.  Left Atrium: The left atrium is mildly dilated.  Right Ventricle: The right ventricle is normal in size. There is normal right ventricular global systolic function.  Right Atrium: The right atrium is mildly dilated.  Aortic Valve: The aortic valve is trileaflet. There is no evidence of aortic valve stenosis.  There is no evidence of aortic valve regurgitation. The peak instantaneous gradient of the aortic valve is 7.1 mmHg. The mean gradient of the aortic valve is 4.3 mmHg.  Mitral Valve: The mitral valve is normal in structure. There is mild mitral valve regurgitation.  Tricuspid Valve: The tricuspid valve is structurally normal. There is mild tricuspid regurgitation.  Pulmonic Valve: The pulmonic valve is structurally normal. There is no indication of pulmonic valve regurgitation.  Pericardium: There is no pericardial effusion noted.  Aorta: The aortic root is normal.  Pulmonary Artery: The tricuspid regurgitant velocity is 2.55 m/s, and with an estimated right atrial  pressure of 10 mmHg, the estimated pulmonary artery pressure is mildly elevated with the RVSP at 36.1 mmHg.  Systemic Veins: The inferior vena cava appears to be of normal size.      CONCLUSIONS:  1. The left ventricular systolic function is low normal with a 55% estimated ejection fraction.  2. Spectral Doppler shows an impaired relaxation pattern of left ventricular diastolic filling.  3. There is moderate concentric left ventricular hypertrophy.  4. There is mild mitral and tricuspid regurgitation.  5. The estimated pulmonary artery pressure is mildly elevated with the RVSP at 36.1 mmHg.    QUANTITATIVE DATA SUMMARY:  2D MEASUREMENTS:  Normal Ranges:  IVSd:          1.73 cm    (0.6-1.1cm)  LVPWd:         1.98 cm    (0.6-1.1cm)  LVIDd:         4.85 cm    (3.9-5.9cm)  LVIDs:         3.26 cm  LV Mass Index: 175.8 g/m2  LV % FS        32.8 %    LA VOLUME:  Normal Ranges:  LA Vol A4C:       77.4 ml    (22+/-6mL/m2)  LA Vol A2C:       75.6 ml  LA Vol BP:        77.9 ml  LA Vol Index A4C: 31.8 ml/m2  LA Vol Index A2C: 31.1 ml/m2  LA Vol Index BP:  32.0 ml/m2  LA Volume Index:  32.0 ml/m2  LA Vol A4C:       70.0 ml  LA Vol A2C:       72.2 ml    RA VOLUME BY A/L METHOD:  Normal Ranges:  RA Area A4C: 17.7 cm2    M-MODE MEASUREMENTS:  Normal Ranges:  Ao Root: 3.40 cm (2.0-3.7cm)  LAs:     5.07 cm (2.7-4.0cm)    LV SYSTOLIC FUNCTION BY 2D PLANIMETRY (MOD):  Normal Ranges:  EF-A4C View: 62.5 % (>55%)  EF-A2C View: 62.1 %  EF-Biplane:  60.7 %    LV DIASTOLIC FUNCTION:  Normal Ranges:  MV Peak E:      0.71 m/s    (0.7-1.2 m/s)  MV Peak A:      0.76 m/s    (0.42-0.7 m/s)  E/A Ratio:      0.94        (1.0-2.2)  MV lateral e'   0.07 m/s  MV medial e'    0.05 m/s  MV A Dur:       101.50 msec  PulmV Sys Gilberto:  39.18 cm/s  PulmV Carrillo Gilberto: 33.55 cm/s  PulmV S/D Gilberto:  1.17    MITRAL VALVE:  Normal Ranges:  MV DT: 267 msec (150-240msec)    AORTIC VALVE:  Normal Ranges:  AoV Vmax:                1.34 m/s (<1.7m/s)  AoV Peak PG:              7.1 mmHg (<20mmHg)  AoV Mean P.3 mmHg (1.7-11.5mmHg)  LVOT Max Gilberto:            0.99 m/s (<1.1m/s)  AoV VTI:                 29.10 cm (18-25cm)  LVOT VTI:                21.51 cm  LVOT Diameter:           1.98 cm  (1.8-2.4cm)  AoV Area, VTI:           2.28 cm2 (2.5-5.5cm2)  AoV Area,Vmax:           2.28 cm2 (2.5-4.5cm2)  AoV Dimensionless Index: 0.74    RIGHT VENTRICLE:  RV 1   4.2 cm  RV 2   2.8 cm  RV 3   7.3 cm  TAPSE: 19.0 mm  RV s'  0.14 m/s    TRICUSPID VALVE/RVSP:  Normal Ranges:  Peak TR Velocity: 2.55 m/s  RV Syst Pressure: 36.1 mmHg (< 30mmHg)    PULMONIC VALVE:  Normal Ranges:  PV Max Gilberto: 1.1 m/s  (0.6-0.9m/s)  PV Max P.1 mmHg    Pulmonary Veins:  PulmV Carrillo Gilberto: 33.55 cm/s  PulmV S/D Gilberto:  1.17  PulmV Sys Gilberto:  39.18 cm/s      [id number] Dayne Santana MD  Electronically signed on 2021 at 1:44:57 PM    ECHOCARDIOGRAM 2024  PHYSICIAN INTERPRETATION:  Left Ventricle: The left ventricular systolic function is mildly decreased. The calculated ejection fraction is mildly decreased at 44 % using the Camara's Bi-plane MOD calculation. There is global hypokinesis of the left ventricle with minor regional variations. The left ventricular cavity size is normal. Spectral Doppler shows an abnormal pattern of left ventricular diastolic filling.  Left Atrium: The left atrium is upper limits of normal in size.  Right Ventricle: The right ventricle is normal in size. There is normal right ventricular global systolic function.  Right Atrium: The right atrium is mildly dilated.  Aortic Valve: The aortic valve is probably trileaflet. There is no evidence of aortic valve regurgitation. The peak instantaneous gradient of the aortic valve is 14.0 mmHg. The mean gradient of the aortic valve is 6.0 mmHg.  Mitral Valve: The mitral valve is mildly thickened. There is trace mitral valve regurgitation.  Tricuspid Valve: The tricuspid valve is structurally normal. No evidence of tricuspid  regurgitation.  Pulmonic Valve: The pulmonic valve is not well visualized. The pulmonic valve regurgitation was not well visualized.  Pericardium: There is no pericardial effusion noted.  Aorta: The aortic root is normal.  In comparison to the previous echocardiogram(s): Compared with study from 7/26/2021, left ventricular systolic function is slightly worse.    CONCLUSIONS:   1. Left ventricular systolic function is mildly decreased.   2. There is global hypokinesis of the left ventricle with minor regional variations.   3. Spectral Doppler shows an abnormal pattern of left ventricular diastolic filling.    Stress Testing: No results found for this or any previous visit from the past 1825 days.    Cardiac Catheterization:   Adult Cath     [facility], Cath Lab, [address] [place] [address]    Cardiovascular Catheterization Report    Patient Name:     MIGUEL ENRIQUEZ Performing Physician: [id number] Mix MD  Study Date:       7/23/2021      Verifying Physician:  [id number] Mix MD  MRN/PID:          [id number]       Cardiologist:  Accession/Order#: [id number]      Fellow:  YOB: 1960     Fellow:  Gender:           M              Referring Physician:  PAULA MIX  Admit Date:                      Referring Physician:  [id number] Dayne Santana MD  Surgeon:                         Referring Physician:  xxxx      Study: Left Heart Catheterization      Indications:  MIGUEL ENRIQUEZ is a 61 year old male who presents with hypertension, dyslipidemia and tobacco Use - current. Acute coronary syndrome <=24 hrs and Immediate PCI for acute STEMI , with a chest pain assessment of typical angina. Study performed as an emergency cath procedure.    Medical History:  Stress test performed: No. CTA performed: No. Agatston accessed: No. LVEF Assessed: Yes.    Procedure Description:  After infiltration with 2% Lidocaine, the right radial artery was cannulated with a modified Seldinger  technique. Subsequently a 6 Telugu sheath was placed in the right radial artery. Selective coronary catheterization was performed using a 6 Fr catheter(s) exchanged over a guide wire to cannulate the coronary arteries. A 3.5 tip catheter was used for left coronary injections. A JR 4 tip catheter was used for right coronary injections.  Multiple injections of contrast were made into the left and right coronary arteries with angiograms recorded in multiple projections. After completion of the procedure, the arterial sheath was pulled and a TR Band Radial Compression Device was utilized to obtain patent hemostasis.    Coronary Angiography:  The coronary circulation is right dominant.    Left Main Coronary Artery:  The left main coronary artery is a normal caliber vessel. The left main arises normally from the left coronary sinus of Valsalva and bifurcates into the LAD and circumflex coronary arteries. The left main coronary artery showed no significant disease or stenosis greater than 30%.    Left Anterior Descending Coronary Artery Distribution:  The left anterior descending coronary artery is a normal caliber vessel. The LAD arises normally from the left main coronary artery. The LAD demonstrated mild atherosclerotic disease.    Circumflex Coronary Artery Distribution:  The circumflex coronary artery is a normal caliber vessel. The circumflex arises normally from the left main coronary artery and terminates in the AV groove. The circumflex revealed no significant disease or stenosis greater than 30%.    Right Coronary Artery Distribution:    The right coronary artery is a normal caliber vessel. The RCA arises normally from the right sinus of Valsalva. The RCA showed total thrombotic occlusion.      Valve Findings:  No aortic valve stenosis is visualized.    Coronary Interventions:  Angiography reveals a 100% stenosis of the mid right coronary artery. Pre-intervention CINDY flow was 0. Percutaneous coronary  intervention was performed within the mid right coronary artery. A XIENCE Erica drug-eluting stent 3.5 mm x 38 mm was advanced to the lesion and implanted at 12 BRAYDEN. A second XIENCE Erica drug-eluting stent 4.0 mm x 38 mm was implanted in overlap at 12 BRAYDEN. The stent was post dilated using a non-compliant balloon 4.0 mm x 20 mm at 16 BRAYDEN. The stenosis was successfully reduced from 100% to <10%. Post-intervention CINDY flow was 3. Aspiration thrombectomy was performed with ASAP catheter.    Complications:  No in-lab complications observed.    Cardiac Cath Transition of Care Summary:  Post Procedure Diagnosis: CAD.  Blood Loss:               Estimated blood loss during the procedure was 0 mls.  Specimens Removed:        Number of specimen(s) removed: none.      Recommendations:  Maximize medical therapy.  Agressive risk factor modification efforts.  Follow-up with cardiology clinic.  Anti-platelet therapy with Dual antiplatelet therapy, Aspirin and Ticagrelor  90mgs BID.  Patient counseled and advised to discontinue smoking.  Consider referral to cardiac rehabilitation.    ____________________________________________________________________________________  CONCLUSIONS:  1. Single vessel coronary artery disease without proximal left anterior descending involvement.  2. Culprit vessel(s): right coronary artery.  3. Successful aspiration thrombectomy, PCI of RCA.  4. Elevated LV filling pressures.  5. Left Ventricular end-diastolic pressure = 36.    ____________________________________________________________________________________  CPT Codes:  Left Heart Cath (visualization of coronaries) and LV-34674; Moderate Sedation Services initial 15 minutes patient >5 years-27238; Moderate Sedation Services 1st additional 15 minutes patient >5 years-78317; Revasc during AMI stent/angio/atherc,ins asp Thrombectomy, Left Anterior Descending single Vessel (PCI)-17575.LD    ICD 10 Codes:  I21.11-ST elevation (STEMI) myocardial  infarction involving right coronary artery    [id number] Jay Mix MD  Performing Physician  Electronically signed by [id number] Jay Mxi MD on 7/24/2021 at 7:36:48 AM      cc Report to: [id number] JAY MIX    cc Report to: [id number] Dayne Santana MD    cc Report to: xxxx      Cardiac Scoring: No results found for this or any previous visit from the past 1825 days.    AAA : No results found for this or any previous visit from the past 1825 days.    OTHER: No results found for this or any previous visit from the past 1825 days.        LABS:  CBC:   Lab Results   Component Value Date    WBC 4.9 01/24/2024    RBC 5.01 01/24/2024    HGB 14.6 01/24/2024    HCT 44.3 01/24/2024    MCV 88 01/24/2024    MCH 29.1 01/24/2024    MCHC 33.0 01/24/2024    RDW 13.7 01/24/2024     (L) 01/24/2024     CBC with Differential:    Lab Results   Component Value Date    WBC 4.9 01/24/2024    RBC 5.01 01/24/2024    HGB 14.6 01/24/2024    HCT 44.3 01/24/2024     (L) 01/24/2024    MCV 88 01/24/2024    MCH 29.1 01/24/2024    MCHC 33.0 01/24/2024    RDW 13.7 01/24/2024    NRBC 0.0 01/24/2024    LYMPHOPCT 6.1 01/23/2024    MONOPCT 8.0 01/23/2024    EOSPCT 0.1 01/23/2024    BASOPCT 0.4 01/23/2024    MONOSABS 0.67 01/23/2024    LYMPHSABS 0.51 (L) 01/23/2024    EOSABS 0.01 01/23/2024    BASOSABS 0.03 01/23/2024     CMP:    Lab Results   Component Value Date     03/06/2024    K 4.1 03/06/2024     03/06/2024    CO2 24 03/06/2024    BUN 23 03/06/2024    CREATININE 0.93 03/06/2024    GLUCOSE 112 (H) 03/06/2024    PROT 6.6 03/06/2024    CALCIUM 9.4 03/06/2024    BILITOT 0.5 03/06/2024    ALKPHOS 69 03/06/2024    AST 13 03/06/2024    ALT 15 03/06/2024     BMP:    Lab Results   Component Value Date     03/06/2024    K 4.1 03/06/2024     03/06/2024    CO2 24 03/06/2024    BUN 23 03/06/2024    CREATININE 0.93 03/06/2024    CALCIUM 9.4 03/06/2024    GLUCOSE 112 (H) 03/06/2024     Magnesium:  Lab Results   Component Value Date     MG 1.92 03/06/2024     Troponin:    Lab Results   Component Value Date    TROPHS 98 (HH) 01/23/2024    TROPHS 99 (HH) 01/23/2024     BNP:   Lab Results   Component Value Date     (H) 03/06/2024       Lipid Panel:  Lab Results   Component Value Date    HDL 35.2 (A) 07/24/2021    CHHDL 5.4 (A) 07/24/2021    VLDL 47 (H) 07/24/2021    TRIG 235 (H) 07/24/2021    NHDL 154 07/24/2021        Lab work and imaging results independently reviewed by me         Visit Vitals  /80   Pulse 61   Wt 142 kg (313 lb 11.4 oz)   SpO2 97%   BMI 41.39 kg/m²   Smoking Status Former   BSA 2.7 m²          ECG dated 1/23/2024 independently reviewed   NSR with PVCs, no significant STT abnormalities       Vitals reviewed.   Constitutional:       General: Awake.      Appearance: Normal and healthy appearance. Well-developed and not in distress. Obese.   Eyes:      General: Lids are normal.      Pupils: Pupils are equal, round, and reactive to light.   HENT:      Nose: Nose normal.    Mouth/Throat:      Lips: Pink.      Mouth: Mucous membranes are moist.   Neck:      Vascular: JVD normal.   Pulmonary:      Effort: Pulmonary effort is normal.      Breath sounds: Normal breath sounds and air entry.   Chest:      Chest wall: Not tender to palpatation.   Cardiovascular:      PMI at left midclavicular line. Normal rate. Regular rhythm. Normal S1. Normal S2.       Murmurs: There is no murmur.      Comments: Trace BLE edema    Edema:     Peripheral edema absent.   Abdominal:      General: Bowel sounds are normal.      Palpations: Abdomen is soft.      Tenderness: There is no abdominal tenderness.   Musculoskeletal: Normal range of motion.      Cervical back: Full passive range of motion without pain, normal range of motion and neck supple. Skin:     General: Skin is warm and dry.   Neurological:      General: No focal deficit present.      Mental Status: Alert, oriented to person, place, and time and oriented to person, place and time.  Mental status is at baseline.   Psychiatric:         Attention and Perception: Attention and perception normal.         Mood and Affect: Mood and affect normal.         Speech: Speech normal.         Behavior: Behavior normal. Behavior is cooperative.         Thought Content: Thought content normal.         Problem List Items Addressed This Visit             ICD-10-CM    Arteriosclerosis of coronary artery I25.10    Relevant Medications    sildenafil (Viagra) 100 mg tablet    Benign essential hypertension I10    Hyperlipidemia, mixed E78.2    Tobacco use disorder F17.200    History of coronary artery stent placement Z95.5     Other Visit Diagnoses         Codes    Heart failure with mildly reduced ejection fraction (HFmrEF) (CMS/[facility])    -  Primary I50.22    Relevant Medications    sildenafil (Viagra) 100 mg tablet    Other Relevant Orders    Comprehensive metabolic panel (Completed)    Magnesium (Completed)    B-Type Natriuretic Peptide (Completed)    Coronary artery disease involving native coronary artery of native heart without angina pectoris     I25.10    Relevant Medications    sildenafil (Viagra) 100 mg tablet    Other Relevant Orders    Comprehensive metabolic panel (Completed)    Magnesium (Completed)    B-Type Natriuretic Peptide (Completed)            Continue all medication as prescribed at this time  Lab work today and will call with update on medication list once results received  Long detailed discussion regarding medications and what they are used for and list provided.   Recommend starting SGLT2i Jardiance as part of GDMT can send to specialty pharmacy.   Reinforced discussed the importance of complete smoking cessations and pharmacological options to do so.  He declines assistance at this time.   Reinforced the value of home BP monitoring. Asked to bring a log to follow up appointment.   Reinforced discussed the value of a plant based, Mediterranean / DASH style diet with reduction of processed  carbohydrates and added salt.   Follow up in 3 months or sooner if needed  Call with questions or concerns      03/08/24 at 9:50 AM - KIERSTEN Franco        Followup Appts:  Future Appointments   Date Time Provider Department Center   5/29/2024 10:20 AM KIERSTEN Franco [id number]

## 2024-03-06 ENCOUNTER — LAB (OUTPATIENT)
Dept: LAB | Facility: LAB | Age: 64
End: 2024-03-06
Payer: MEDICARE

## 2024-03-06 ENCOUNTER — OFFICE VISIT (OUTPATIENT)
Dept: CARDIOLOGY | Facility: CLINIC | Age: 64
End: 2024-03-06
Payer: MEDICARE

## 2024-03-06 VITALS
BODY MASS INDEX: 41.39 KG/M2 | HEART RATE: 61 BPM | WEIGHT: 313.71 LBS | SYSTOLIC BLOOD PRESSURE: 150 MMHG | DIASTOLIC BLOOD PRESSURE: 80 MMHG | OXYGEN SATURATION: 97 %

## 2024-03-06 DIAGNOSIS — I25.10 CORONARY ARTERY DISEASE INVOLVING NATIVE CORONARY ARTERY OF NATIVE HEART WITHOUT ANGINA PECTORIS: ICD-10-CM

## 2024-03-06 DIAGNOSIS — E78.2 HYPERLIPIDEMIA, MIXED: ICD-10-CM

## 2024-03-06 DIAGNOSIS — Z95.5 HISTORY OF CORONARY ARTERY STENT PLACEMENT: ICD-10-CM

## 2024-03-06 DIAGNOSIS — I10 BENIGN ESSENTIAL HYPERTENSION: ICD-10-CM

## 2024-03-06 DIAGNOSIS — I50.22 HEART FAILURE WITH MILDLY REDUCED EJECTION FRACTION (HFMREF) (MULTI): ICD-10-CM

## 2024-03-06 DIAGNOSIS — F17.200 TOBACCO USE DISORDER: ICD-10-CM

## 2024-03-06 DIAGNOSIS — I25.10 ARTERIOSCLEROSIS OF CORONARY ARTERY: ICD-10-CM

## 2024-03-06 DIAGNOSIS — I50.22 HEART FAILURE WITH MILDLY REDUCED EJECTION FRACTION (HFMREF) (MULTI): Primary | ICD-10-CM

## 2024-03-06 LAB
ALBUMIN SERPL BCP-MCNC: 4.5 G/DL (ref 3.4–5)
ALP SERPL-CCNC: 69 U/L (ref 33–136)
ALT SERPL W P-5'-P-CCNC: 15 U/L (ref 10–52)
ANION GAP SERPL CALC-SCNC: 14 MMOL/L (ref 10–20)
AST SERPL W P-5'-P-CCNC: 13 U/L (ref 9–39)
BILIRUB SERPL-MCNC: 0.5 MG/DL (ref 0–1.2)
BNP SERPL-MCNC: 232 PG/ML (ref 0–99)
BUN SERPL-MCNC: 23 MG/DL (ref 6–23)
CALCIUM SERPL-MCNC: 9.4 MG/DL (ref 8.6–10.3)
CHLORIDE SERPL-SCNC: 103 MMOL/L (ref 98–107)
CO2 SERPL-SCNC: 24 MMOL/L (ref 21–32)
CREAT SERPL-MCNC: 0.93 MG/DL (ref 0.5–1.3)
EGFRCR SERPLBLD CKD-EPI 2021: >90 ML/MIN/1.73M*2
GLUCOSE SERPL-MCNC: 112 MG/DL (ref 74–99)
MAGNESIUM SERPL-MCNC: 1.92 MG/DL (ref 1.6–2.4)
POTASSIUM SERPL-SCNC: 4.1 MMOL/L (ref 3.5–5.3)
PROT SERPL-MCNC: 6.6 G/DL (ref 6.4–8.2)
SODIUM SERPL-SCNC: 137 MMOL/L (ref 136–145)

## 2024-03-06 PROCEDURE — 83735 ASSAY OF MAGNESIUM: CPT

## 2024-03-06 PROCEDURE — 80053 COMPREHEN METABOLIC PANEL: CPT

## 2024-03-06 PROCEDURE — 83880 ASSAY OF NATRIURETIC PEPTIDE: CPT

## 2024-03-06 PROCEDURE — 3079F DIAST BP 80-89 MM HG: CPT | Performed by: NURSE PRACTITIONER

## 2024-03-06 PROCEDURE — 1160F RVW MEDS BY RX/DR IN RCRD: CPT | Performed by: NURSE PRACTITIONER

## 2024-03-06 PROCEDURE — 36415 COLL VENOUS BLD VENIPUNCTURE: CPT

## 2024-03-06 PROCEDURE — 3077F SYST BP >= 140 MM HG: CPT | Performed by: NURSE PRACTITIONER

## 2024-03-06 PROCEDURE — 1159F MED LIST DOCD IN RCRD: CPT | Performed by: NURSE PRACTITIONER

## 2024-03-06 PROCEDURE — 99214 OFFICE O/P EST MOD 30 MIN: CPT | Performed by: NURSE PRACTITIONER

## 2024-03-06 PROCEDURE — 1126F AMNT PAIN NOTED NONE PRSNT: CPT | Performed by: NURSE PRACTITIONER

## 2024-03-06 NOTE — PATIENT INSTRUCTIONS
Aspirin indefinitely  Continue clopidogrel given continuing to smoke can consider stopping if successfully quit smoking    Heart failure medications to treat heart failure include:  Losartan, metoprolol, spironolactone, furosemide   We can consider adding Farxiga or jardiance as these have also been statistically proven to help improve heart failure. There are hospital based programs to help cover this cost through the drug company and patients often pay nothing. This would help with diabetes control, even weight and heart failure.   These drugs work to lower blood pressure, overall workload of the heart to help improve heart function    Amlodipine is used to treat high blood pressure  Atorvastatin help with cholesterol    After I have received blood work results I will call you with an update on if we can stop medication.   Furosemide can be take as needed for increased swelling or worsened SOB and the potassium you're taking is to  help replenish the potassium your body is getting rid of as a results of the furosemide. Spironolactone helps to hold onto potassium.     Please weight yourself every day, in the morning in your bed clothes after urinating. Write it down in a note book.  Please call the office at [phone] if you gain more than 2-3 lbs overnight or 5 lbs in a week that does not come down the next day.   You should restrict fluid to around 64 oz/day. This includes coffee, tea, soda, juice and alcohol.   Goal sodium intake 2G/day ( 2000mg). Please road labels on prepackaged foods and avoid the salt shaker completely     Home BP monitoring instructions:::: Goal < 130 / 80   Remain still, Avoid smoking, caffeinated beverages, or exercise within 30 min before BP measurements.  Ensure 5 min of quiet rest before BP measurements.  Sit correctly with back straight and supported (on a straight-backed dining chair, for example, rather than a sofa).  Sit with feet flat on the floor and legs uncrossed.  Keep arm  supported on a flat surface (such as a table), with the upper arm at heart level.  Bottom of the cuff should be placed directly above the bend of the elbow  Take a reading in the AM before breakfast 2-3 times / week   Record all readings accurately:  A written log should be brought to all appointments   Monitors with built-in memory should be brought to all clinic appointments and calibrated to our machines       Follow up 3 months or sooner if needed   Call with questions or concerns

## 2024-03-08 DIAGNOSIS — I50.22 CHRONIC SYSTOLIC HEART FAILURE (MULTI): Primary | ICD-10-CM

## 2024-04-02 ENCOUNTER — TELEMEDICINE (OUTPATIENT)
Dept: PHARMACY | Facility: HOSPITAL | Age: 64
End: 2024-04-02
Payer: MEDICARE

## 2024-04-02 DIAGNOSIS — I50.22 CHRONIC SYSTOLIC HEART FAILURE (MULTI): ICD-10-CM

## 2024-04-02 NOTE — PROGRESS NOTES
"Pharmacist Clinic: Heart Failure/Cardiomyopathy Management  Mitch Olivarez was referred to the Clinical Pharmacy Team for her heart failure/cardiomyopathy management.    Referring Provider:  ALEX [facility]    PHARMACY ASSESSMENT    Allergies Reviewed? Yes  Home Pharmacy Reviewed? Yes, describe: [facility] in [place]    Affordability/Accessibility: $517 for 90 day supply of Jardiance  Adherence/Organization: has not started medication.  Adverse Effects: has not started medication.    CURRENT PHARMACOTHERAPY:   - Jardiance 10mg daily which is appropriate for this patient eGFR >30ml/min (>90).    HISTORICAL PHARMACOTHERAPY:   - previously prescribed Farxiga    MEDICATION RECONCILIATION  no    RELEVANT LAB RESULTS  Lab Results   Component Value Date    BILITOT 0.5 03/06/2024    CALCIUM 9.4 03/06/2024    CO2 24 03/06/2024     03/06/2024    CREATININE 0.93 03/06/2024    GLUCOSE 112 (H) 03/06/2024    ALKPHOS 69 03/06/2024    K 4.1 03/06/2024    PROT 6.6 03/06/2024     03/06/2024    AST 13 03/06/2024    ALT 15 03/06/2024    BUN 23 03/06/2024    ANIONGAP 14 03/06/2024    MG 1.92 03/06/2024    ALBUMIN 4.5 03/06/2024    LIPASE 32 07/23/2021     Lab Results   Component Value Date    TRIG 235 (H) 07/24/2021    CHOL 189 07/24/2021    HDL 35.2 (A) 07/24/2021     No results found for: \"BMCBC\", \"CBCDIF\"       DRUG INTERACTIONS  - No    CHF ASSESSMENT     Symptom/Staging:  -Most recent ejection fraction: 44%  -NYHA Stage: II    Guideline-Directed Medical Therapy:  -ARNI: No   -If no, then ACEi/ARB?: Yes, describe: losartan 50mg  -Beta Blocker: Yes, describe: metoprolol succinate 25mg  -MRA: Yes, describe: spironolactone 25mg  -SGLT2i: Yes, describe: Jardiance 10mg    Secondary Prevention:  -The ASCVD Risk score (Hossein NIELSEN, et al., 2019) failed to calculate for the following reasons:    The patient has a prior MI or stroke diagnosis   -Aspirin 81mg? yes  -Statin?: Yes, describe: atorvastatin 80mg  -HTN?: Yes, " describe: furosemide 40mg and amlodipine 10mg    PATIENT EDUCATION/GOALS  - Counseled patient on MOA, expectations, duration of therapy, contraindications, administration, and monitoring parameters  - Counseled patient on lifestyle modifications that can decrease your risk of having complications (smoking cessation, losing weight, daily weights, vaccines)  - Counseled patient on fluid intake and weight management. Recommended to not consume more than 2 liters of fliuids per day. If they have gained more than 2-3 pounds within a 24 hours period (or 5 pounds in a week), contact their cardiologist  - Answered all patient questions and concerns  - Aware of side effects from Jardiance. Reports that he does not have any history of urinary tract infections.     Patient Assistance Program (PAP)    Patient verbally reports monthly or yearly income which is less than 400% federal poverty level    Application for program to be submitted for the following medications: Jardiance    Prescription Insurance: Yes  Members of Household: 1  Files Taxes: Yes    Patient will be email financial information to pharmacist directly at [email or web address].    Patient aware this process may take up to 6 weeks.     If approved medication must be filled through [facility] pharmacy and mailed to patient.   Patient address: [address] [place] [address]      RECOMMENDATIONS/PLAN  1. Start Jardiance 10mg daily once approved for  PAP    Next Cardiology Appointment: 5/29/24  Clinical Pharmacist follow up: 7/2/24  Type of Encounter: Jimi Kraus PharmD  PGY1 Resident     Verbal consent to manage patient's drug therapy was obtained from the patient . They were informed they may decline to participate or withdraw from participation in pharmacy services at any time.    Continue all meds under the continuation of care with the referring provider and clinical pharmacy team.

## 2024-04-02 NOTE — Clinical Note
Dav Vu! We are going to try to get Mitch set up with [facility] PAP for Jardiance. Will continue to follow.

## 2024-04-03 ENCOUNTER — SPECIALTY PHARMACY (OUTPATIENT)
Dept: PHARMACY | Facility: CLINIC | Age: 64
End: 2024-04-03

## 2024-04-05 PROCEDURE — RXMED WILLOW AMBULATORY MEDICATION CHARGE

## 2024-04-09 ENCOUNTER — PHARMACY VISIT (OUTPATIENT)
Dept: PHARMACY | Facility: CLINIC | Age: 64
End: 2024-04-09
Payer: COMMERCIAL

## 2024-04-09 ENCOUNTER — TELEPHONE (OUTPATIENT)
Dept: PHARMACY | Facility: HOSPITAL | Age: 64
End: 2024-04-09
Payer: MEDICARE

## 2024-04-09 NOTE — TELEPHONE ENCOUNTER
Patient Assistance Program Approval:     We are pleased to inform you that your application for assistance has been approved.     This approval is valid through  4/5/25  as long as the following criteria continue to be satisfied:     Your medication (Jardiance) remains covered under your current insurance plan.   Your prescriber does not discontinue therapy.   You do not seek reimbursement from any other private or government-funded programs for the  medication.    Under this program, the pharmacy will first bill your insurance plan for your indemnified specified medication. The [employer] Assistance Fund will then offset your copay balance, so that your out-of pocket expense for your specialty medication will be $0.00.    Sunil Kraus, BostonD

## 2024-05-29 ENCOUNTER — APPOINTMENT (OUTPATIENT)
Dept: CARDIOLOGY | Facility: CLINIC | Age: 64
End: 2024-05-29
Payer: MEDICARE

## 2024-07-01 PROCEDURE — RXMED WILLOW AMBULATORY MEDICATION CHARGE

## 2024-07-02 ENCOUNTER — APPOINTMENT (OUTPATIENT)
Dept: PHARMACY | Facility: HOSPITAL | Age: 64
End: 2024-07-02
Payer: MEDICARE

## 2024-07-02 DIAGNOSIS — I50.22 CHRONIC SYSTOLIC HEART FAILURE (MULTI): Primary | ICD-10-CM

## 2024-07-02 NOTE — PROGRESS NOTES
"  Pharmacist Clinic: Cardiology Management    Mitch Olivarez \"Biju\" is a 63 y.o. male was referred to Clinical Pharmacy Team for heart failure management.     Referring Provider: Mirella Gill APRN-C*    THIS IS A FOLLOW UP PATIENT APPOINTMENT. AT LAST VISIT ON 4/2/2024 WITH PHARMACIST (Sunil Kraus).      REVIEW OF PAST APPNT (IF APPLICABLE):   Patient was enrolled in  patient assistance program to cover for new start Jardiance. Application was approved through 4/5/2025    Allergies Reviewed? Yes    Allergies   Allergen Reactions    Hydrocodone-Acetaminophen Unknown    Lisinopril Unknown       Past Medical History:   Diagnosis Date    CHF (congestive heart failure) (Multi)     Depression, unspecified 07/19/2019    Depression    Diabetes mellitus (Multi)     ED (erectile dysfunction)     Heart disease     Hyperlipemia     Hypertension     Obesity     Personal history of other diseases of the respiratory system 03/21/2017    History of acute sinusitis    Personal history of other diseases of the respiratory system 03/21/2017    History of acute sinusitis    Personal history of other specified conditions 10/10/2014    History of dysuria       Current Outpatient Medications on File Prior to Visit   Medication Sig Dispense Refill    albuterol 90 mcg/actuation inhaler INHALE 2 PUFFS BY MOUTH FOUR TIMES DAILY AS NEEDED 8.5 g 0    amLODIPine (Norvasc) 10 mg tablet Take 1 tablet (10 mg) by mouth once daily.      aspirin 81 mg EC tablet Take 1 tablet (81 mg) by mouth once daily.      atorvastatin (Lipitor) 80 mg tablet Take 1 tablet (80 mg) by mouth once daily at bedtime.      citalopram (CeleXA) 20 mg tablet Take 1 tablet (20 mg) by mouth once daily.      clopidogrel (Plavix) 75 mg tablet Take 1 tablet (75 mg) by mouth once daily.      empagliflozin (Jardiance) 10 mg Take 1 tablet (10 mg) by mouth once daily. 90 tablet 3    furosemide (Lasix) 40 mg tablet Take 1 tablet (40 mg) by mouth once daily. 30 tablet 0    " "losartan (Cozaar) 50 mg tablet Take 1 tablet (50 mg) by mouth once daily. Do not start before January 27, 2024. 30 tablet 0    metFORMIN (Glucophage) 500 mg tablet TAKE 1 TABLET BY MOUTH TWICE DAILY WITH MEALS 60 tablet 0    metoprolol succinate XL (Toprol-XL) 25 mg 24 hr tablet Take 1 tablet (25 mg) by mouth once daily. Do not crush or chew.      potassium chloride CR 20 mEq ER tablet Take 1 tablet (20 mEq) by mouth once daily. Do not crush or chew. 30 tablet 0    sildenafil (Viagra) 100 mg tablet Take 1 tablet (100 mg) by mouth if needed.      spironolactone (Aldactone) 25 mg tablet Take 1 tablet (25 mg) by mouth once daily. Do not start before January 27, 2024. 30 tablet 1     No current facility-administered medications on file prior to visit.         RELEVANT LAB RESULTS  Lab Results   Component Value Date    BILITOT 0.5 03/06/2024    CALCIUM 9.4 03/06/2024    CO2 24 03/06/2024     03/06/2024    CREATININE 0.93 03/06/2024    GLUCOSE 112 (H) 03/06/2024    ALKPHOS 69 03/06/2024    K 4.1 03/06/2024    PROT 6.6 03/06/2024     03/06/2024    AST 13 03/06/2024    ALT 15 03/06/2024    BUN 23 03/06/2024    ANIONGAP 14 03/06/2024    MG 1.92 03/06/2024    ALBUMIN 4.5 03/06/2024    LIPASE 32 07/23/2021     Lab Results   Component Value Date    TRIG 235 (H) 07/24/2021    CHOL 189 07/24/2021    HDL 35.2 (A) 07/24/2021     No results found for: \"BMCBC\", \"CBCDIF\"     PHARMACEUTICAL ASSESSMENT    MEDICATION RECONCILIATION    Home Pharmacy Reviewed? Yes, describe: [facility] [place]    Added:  - N/A  Changed:  - N/A  Removed:  - N/A    Medication Documentation Review Audit       Reviewed by KIERSTEN Franco (Nurse Practitioner) on 03/06/24 at 1210      Medication Order Taking? Sig Documenting Provider Last Dose Status   albuterol 90 mcg/actuation inhaler [id number] Yes INHALE 2 PUFFS BY MOUTH FOUR TIMES DAILY AS NEEDED Lorie Arredondo MD Taking Active   amLODIPine (Norvasc) 10 mg tablet [id number] Yes " Take 1 tablet (10 mg) by mouth once daily. Franci Meneses MD Taking Active   aspirin 81 mg EC tablet [id number] Yes Take 1 tablet (81 mg) by mouth once daily. Franci Meneses MD Taking Active   atorvastatin (Lipitor) 80 mg tablet [id number] Yes Take 1 tablet (80 mg) by mouth once daily at bedtime. Franci Meneses MD Taking Active   Patient not taking:  Discontinued 03/06/24 1200   citalopram (CeleXA) 20 mg tablet [id number] Yes Take 1 tablet (20 mg) by mouth once daily. Franci Meneses MD Taking Active   clopidogrel (Plavix) 75 mg tablet [id number] Yes Take 1 tablet (75 mg) by mouth once daily. Franci Meneses MD Taking Active   dapagliflozin propanediol (Farxiga) 10 mg [id number] No Take 1 tablet (10 mg) by mouth once daily.   Patient not taking: Reported on 3/6/2024    Lorie Arredondo MD Not Taking Flag for Review   furosemide (Lasix) 40 mg tablet [id number] Yes Take 1 tablet (40 mg) by mouth once daily. Lorie Arredondo MD Taking Active   losartan (Cozaar) 50 mg tablet [id number] Yes Take 1 tablet (50 mg) by mouth once daily. Do not start before January 27, 2024. Lorie Arredondo MD Taking Active   metFORMIN (Glucophage) 500 mg tablet [id number] Yes TAKE 1 TABLET BY MOUTH TWICE DAILY WITH MEALS Lorie Arredondo MD Taking Active   metoprolol succinate XL (Toprol-XL) 25 mg 24 hr tablet [id number] Yes Take 1 tablet (25 mg) by mouth once daily. Do not crush or chew. Franci Meneses MD Taking Active   potassium chloride CR 20 mEq ER tablet [id number] Yes Take 1 tablet (20 mEq) by mouth once daily. Do not crush or chew. Lorie Arredondo MD Taking Active   sildenafil (Viagra) 100 mg tablet [id number] Yes Take 1 tablet (100 mg) by mouth if needed. Franci Menesse MD Taking Active   spironolactone (Aldactone) 25 mg tablet [id number] Yes Take 1 tablet (25 mg) by mouth once daily. Do not start before January 27, 2024. Lorie Arredondo MD Taking Active   Discontinued 03/06/24 1120                     DISEASE MANAGEMENT ASSESSMENT:     CHF ASSESSMENT     Symptom/Staging:  -Most recent ejection fraction: 44%  -NYHA Stage: II    Results for orders placed during the hospital encounter of 01/24/24    Transthoracic Echo (TTE) Complete    Narrative  [facility], [address] [place] [address]  Tel [phone] and Fax [phone]    TRANSTHORACIC ECHOCARDIOGRAM REPORT      Patient Name:      MIGUEL ENRIQUEZ       Reading Physician:    [id number] Ancelmo Torres MD  Study Date:        1/24/2024            Ordering Provider:    [id number] COLIN WATT  MRN/PID:           [id number]             Fellow:  Accession#:        [id number]         Nurse:                Lynne Mccoy RN  Date of Birth/Age: 1960 / 63      Sonographer:          Janet Rodriguez RDCS  years  Gender:            M                    Additional Staff:  Height:            185.42 cm            Admit Date:           1/24/2024  Weight:            136.53 kg            Admission Status:     Inpatient -  Routine  BSA:               2.56 m2              Encounter#:           [id number]  Department Location:  [facility] Non  Invasive  Blood Pressure: 183 /75 mmHg    Study Type:    TRANSTHORACIC ECHO (TTE) COMPLETE  Diagnosis/ICD: Heart failure, unspecified-I50.9  Indication:    Congestive Heart Failure  CPT Code:      Echo Complete w Full [id number]    Patient History:  Diabetes:          Yes  Pertinent History: Hyperlipidemia and HTN. Flu+, H/O cardiac stents.    Study Detail: The following Echo studies were performed: 2D, M-Mode, Doppler and  color flow. Technically challenging study due to patient lying in  supine position, body habitus and prominent lung artifact.  Definity used as a contrast agent for endocardial border  definition. Total contrast used for this procedure was 2.5 mL via  IV push. The patient was awake.      PHYSICIAN INTERPRETATION:  Left Ventricle: The left ventricular systolic function is mildly decreased. The  calculated ejection fraction is mildly decreased at 44 % using the Camara's Bi-plane MOD calculation. There is global hypokinesis of the left ventricle with minor regional variations. The left ventricular cavity size is normal. Spectral Doppler shows an abnormal pattern of left ventricular diastolic filling.  Left Atrium: The left atrium is upper limits of normal in size.  Right Ventricle: The right ventricle is normal in size. There is normal right ventricular global systolic function.  Right Atrium: The right atrium is mildly dilated.  Aortic Valve: The aortic valve is probably trileaflet. There is no evidence of aortic valve regurgitation. The peak instantaneous gradient of the aortic valve is 14.0 mmHg. The mean gradient of the aortic valve is 6.0 mmHg.  Mitral Valve: The mitral valve is mildly thickened. There is trace mitral valve regurgitation.  Tricuspid Valve: The tricuspid valve is structurally normal. No evidence of tricuspid regurgitation.  Pulmonic Valve: The pulmonic valve is not well visualized. The pulmonic valve regurgitation was not well visualized.  Pericardium: There is no pericardial effusion noted.  Aorta: The aortic root is normal.  In comparison to the previous echocardiogram(s): Compared with study from 7/26/2021, left ventricular systolic function is slightly worse.      CONCLUSIONS:  1. Left ventricular systolic function is mildly decreased.  2. There is global hypokinesis of the left ventricle with minor regional variations.  3. Spectral Doppler shows an abnormal pattern of left ventricular diastolic filling.    QUANTITATIVE DATA SUMMARY:  2D MEASUREMENTS:  Normal Ranges:  Ao Root d:     3.60 cm    (2.0-3.7cm)  IVSd:          1.26 cm    (0.6-1.1cm)  LVPWd:         1.19 cm    (0.6-1.1cm)  LVIDd:         6.04 cm    (3.9-5.9cm)  LVIDs:         4.58 cm  LV Mass Index: 127.5 g/m2  LV % FS        24.2 %    LA VOLUME:  Normal Ranges:  LA Vol A4C:        85.6 ml    (22+/-6mL/m2)  LA Vol A2C:         80.0 ml  LA Vol BP:         84.0 ml  LA Vol Index A4C:  33.4ml/m2  LA Vol Index A2C:  31.3 ml/m2  LA Vol Index BP:   32.8 ml/m2  LA Area A4C:       26.3 cm2  LA Area A2C:       25.8 cm2  LA Major Axis A4C: 6.9 cm  LA Major Axis A2C: 7.1 cm  LA Volume Index:   31.4 ml/m2  LA Vol A4C:        81.0 ml  LA Vol A2C:        77.8 ml    RA VOLUME BY A/L METHOD:  Normal Ranges:  RA Area A4C: 21.6 cm2    M-MODE MEASUREMENTS:  Normal Ranges:  Ao Root: 3.60 cm (2.0-3.7cm)  LAs:     5.70 cm (2.7-4.0cm)    AORTA MEASUREMENTS:  Normal Ranges:  Ao Sinus, d: 3.60 cm (2.1-3.5cm)  Asc Ao, d:   3.60 cm (2.1-3.4cm)    LV SYSTOLIC FUNCTION BY 2D PLANIMETRY (MOD):  Normal Ranges:  EF-A4C View: 44.6 % (>=55%)  EF-A2C View: 44.4 %  EF-Biplane:  44.3 %    LV DIASTOLIC FUNCTION:  Normal Ranges:  MV Peak E:        0.57 m/s    (0.7-1.2 m/s)  MV Peak A:        0.66 m/s    (0.42-0.7 m/s)  E/A Ratio:        0.86        (1.0-2.2)  MV e'             0.10 m/s    (>8.0)  MV lateral e'     0.10 m/s  MV medial e'      0.05 m/s  MV A Dur:         169.00 msec  E/e' Ratio:       5.66        (<8.0)  PulmV Sys Gilberto:    48.00 cm/s  PulmV Carrillo Gilberto:   43.40 cm/s  PulmV S/D Gilberto:    1.10  PulmV A Revs Gilberto: 21.90 cm/s  PulmV A Revs Dur: 124.00 msec    MITRAL VALVE:  Normal Ranges:  MV DT: 282 msec (150-240msec)    AORTIC VALVE:  Normal Ranges:  AoV Vmax:                1.87 m/s  (<=1.7m/s)  AoV Peak P.0 mmHg (<20mmHg)  AoV Mean P.0 mmHg  (1.7-11.5mmHg)  LVOT Max Gilberto:            1.44 m/s  (<=1.1m/s)  AoV VTI:                 32.90 cm  (18-25cm)  LVOT VTI:                23.90 cm  LVOT Diameter:           2.00 cm   (1.8-2.4cm)  AoV Area, VTI:           2.28 cm2  (2.5-5.5cm2)  AoV Area,Vmax:           2.42 cm2  (2.5-4.5cm2)  AoV Dimensionless Index: 0.73      RIGHT VENTRICLE:  RV Basal 3.97 cm  RV Mid   2.88 cm  RV Major 10.3 cm  TAPSE:   26.9 mm  RV s'    0.14 m/s    TRICUSPID VALVE/RVSP:  Normal Ranges:  Peak TR Velocity: 2.07  m/s  RV Syst Pressure: 20.1 mmHg (< 30mmHg)  IVC Diam:         2.25 cm    PULMONIC VALVE:  Normal Ranges:  PV Max Gilberto: 1.0 m/s  (0.6-0.9m/s)  PV Max PG:  3.7 mmHg    Pulmonary Veins:  PulmV A Revs Dur: 124.00 msec  PulmV A Revs Gilberto: 21.90 cm/s  PulmV Carrillo Gilberto:   43.40 cm/s  PulmV S/D Gilberto:    1.10  PulmV Sys Gilberto:    48.00 cm/s      [id number] Ancelmo Torres MD  Electronically signed on 1/24/2024 at 4:04:14 PM        ** Final **      Guideline-Directed Medical Therapy:  -ARNI: No   -If no, then ACEi/ARB?: Yes, describe: losartan 50 mg daily  -Beta Blocker: Yes, describe: metoprolol succinate 25 mg daily  -MRA: Yes, describe: spironolactone 25 mg daily  -SGLT2i: Yes, describe: Jardiance 10 mg daily    Secondary Prevention:  -The ASCVD Risk score (Hossein NIELSEN, et al., 2019) failed to calculate for the following reasons:    The patient has a prior MI or stroke diagnosis   -Aspirin 81mg? yes  -Statin?: Yes, describe: atorvastatin 80 mg  -HTN?: Yes, describe: uncontrolled, /80  as of 3/6/2024    CURRENT PHARMACOTHERAPY:   losartan 50 mg daily  metoprolol succinate 25 mg daily  spironolactone 25 mg daily  Jardiance 10 mg daily  Appropriate dosing for Crcl 159 ml/min (Scr 0.93 mg/dl 3/2024)    Affordability: Jardiance approved through  PAP through 4/5/2025  Adherence/Compliance: taking medications as prescribed  Adverse Effects: none reported at this visit, denied urinary problems    Monitoring Weights at Home: Yes, patient has been trying to lose weight with exercising  Home Weight Recordings: 290-300 lbs    Past In Office Weight Readings:   Wt Readings from Last 6 Encounters:   03/06/24 142 kg (313 lb 11.4 oz)   01/24/24 137 kg (301 lb 8 oz)   01/23/24 138 kg (304 lb 10.8 oz)   12/08/21 133 kg (293 lb 5 oz)   08/03/21 127 kg (280 lb)       Monitoring Blood Pressure at Home: N/A, monitor on and off, he was told at his primary doctor that his blood pressure has been in good range, cannot recall on the top of his head  Home BP  Recordings: N/A    Past In Office BP Readings:   BP Readings from Last 6 Encounters:   03/06/24 150/80   01/26/24 124/71   01/24/24 160/72   12/08/21 132/78   08/03/21 137/83       HEALTH MANAGEMENT    Maintaining fluid restriction (<2 L/day): Yes  Edema/swelling: No  Shortness of breath: Yes, attributed to smoking. Got better with exercising  Trouble sleeping/lying down: No  Dry/hacking cough: No  Recent Hospitalizations: No    DISCUSSION/NOTES  Patient is a GI physician, he is on call every 2 weeks. He's been trying to lose weight with exercising, reported weight around 290-300 lbs, has not noticed any significant weight change. He's been following fluid restriction. He started Jardiance after  PAP approval, no side effects reported, denied urinary pain or dehydration.     EDUCATION/COUNSELING:   - Counseled patient on MOA, expectations, duration of therapy, contraindications, administration, and monitoring parameters  - Counseled patient on lifestyle modifications that can decrease your risk of having complications (smoking cessation, losing weight, daily weights, vaccines)  - Counseled patient on fluid intake and weight management. Recommended to not consume more than 2 liters of fluids per day. If they have gained more than 2-3 pounds within a 24 hours period (or 5 pounds in a week), contact their cardiologist  - Reviewed the benefits of SGLT-2i therapy, such as glycemic control and kidney and CV protection.  - Advised patient to practice proper  hygiene to reduce risk of UTIs or yeast infections.  - Advised patient to maintain adequate fluid intake to remain hydrated while on SGLT2i therapy.  - Answered all patient questions and concerns.        RECOMMENDATIONS/PLAN    Continue  losartan 50 mg daily  metoprolol succinate 25 mg daily  spironolactone 25 mg daily  Jardiance 10 mg daily  Encouraged patient to monitor blood pressure 2-3 times a week. Patient verbalized understanding  Discussed with patient  regarding starting Entresto, will revisit at next visit    Next Cardiology Appointment: 8/6/2024  Clinical Pharmacist follow up: 10/2/2024  VAF/Application Expiration: Yes    Date: 4/5/2025  Type of Encounter: Jimi Avila PharmD    Verbal consent to manage patient's drug therapy was obtained from the patient . They were informed they may decline to participate or withdraw from participation in pharmacy services at any time.    Continue all meds under the continuation of care with the referring provider and clinical pharmacy team.

## 2024-07-02 NOTE — Clinical Note
Dav Vu. 1. Continue a. losartan 50 mg daily b. metoprolol succinate 25 mg daily c. spironolactone 25 mg daily d. Jardiance 10 mg daily 2. Encouraged patient to monitor blood pressure 2-3 times a week. Patient verbalized understanding 3. Discussed with patient regarding starting Entresto, will revisit at next visit

## 2024-07-02 NOTE — PROGRESS NOTES
I reviewed the progress note and agree with the resident’s findings and plans as written.     Samantha Monteiro, PharmD

## 2024-07-03 ENCOUNTER — PHARMACY VISIT (OUTPATIENT)
Dept: PHARMACY | Facility: CLINIC | Age: 64
End: 2024-07-03
Payer: COMMERCIAL

## 2024-08-06 ENCOUNTER — APPOINTMENT (OUTPATIENT)
Dept: CARDIOLOGY | Facility: CLINIC | Age: 64
End: 2024-08-06
Payer: MEDICARE

## 2024-08-06 VITALS
HEART RATE: 51 BPM | HEIGHT: 72 IN | DIASTOLIC BLOOD PRESSURE: 87 MMHG | BODY MASS INDEX: 40.63 KG/M2 | SYSTOLIC BLOOD PRESSURE: 150 MMHG | OXYGEN SATURATION: 94 % | WEIGHT: 300 LBS

## 2024-08-06 DIAGNOSIS — I50.22 CHRONIC SYSTOLIC HEART FAILURE (MULTI): ICD-10-CM

## 2024-08-06 DIAGNOSIS — Z95.5 PRESENCE OF DRUG COATED STENT IN CORONARY ARTERY: Primary | ICD-10-CM

## 2024-08-06 DIAGNOSIS — I10 BENIGN ESSENTIAL HYPERTENSION: ICD-10-CM

## 2024-08-06 DIAGNOSIS — I50.9 HEART FAILURE, UNSPECIFIED (MULTI): ICD-10-CM

## 2024-08-06 DIAGNOSIS — E78.00 PURE HYPERCHOLESTEROLEMIA: ICD-10-CM

## 2024-08-06 RX ORDER — LOSARTAN POTASSIUM 50 MG/1
50 TABLET ORAL DAILY
Qty: 90 TABLET | Refills: 3 | Status: SHIPPED | OUTPATIENT
Start: 2024-08-06 | End: 2025-08-06

## 2024-08-06 RX ORDER — ATORVASTATIN CALCIUM 80 MG/1
80 TABLET, FILM COATED ORAL NIGHTLY
Qty: 90 TABLET | Refills: 3 | Status: SHIPPED | OUTPATIENT
Start: 2024-08-06 | End: 2025-08-06

## 2024-08-06 RX ORDER — FUROSEMIDE 40 MG/1
40 TABLET ORAL DAILY
Qty: 90 TABLET | Refills: 0 | Status: SHIPPED | OUTPATIENT
Start: 2024-08-06 | End: 2024-11-04

## 2024-08-06 RX ORDER — METOPROLOL SUCCINATE 25 MG/1
25 TABLET, EXTENDED RELEASE ORAL DAILY
Qty: 90 TABLET | Refills: 3 | Status: SHIPPED | OUTPATIENT
Start: 2024-08-06 | End: 2025-08-06

## 2024-08-06 RX ORDER — CLOPIDOGREL BISULFATE 75 MG/1
75 TABLET ORAL DAILY
Qty: 90 TABLET | Refills: 3 | Status: SHIPPED | OUTPATIENT
Start: 2024-08-06 | End: 2025-08-06

## 2024-08-06 RX ORDER — AMLODIPINE BESYLATE 10 MG/1
10 TABLET ORAL DAILY
Qty: 90 TABLET | Refills: 3 | Status: SHIPPED | OUTPATIENT
Start: 2024-08-06 | End: 2025-08-06

## 2024-08-06 RX ORDER — SPIRONOLACTONE 25 MG/1
25 TABLET ORAL DAILY
Qty: 90 TABLET | Refills: 3 | Status: SHIPPED | OUTPATIENT
Start: 2024-08-06 | End: 2025-08-06

## 2024-08-06 RX ORDER — ASPIRIN 81 MG/1
81 TABLET ORAL DAILY
Qty: 90 TABLET | Refills: 3 | Status: SHIPPED | OUTPATIENT
Start: 2024-08-06 | End: 2025-08-06

## 2024-08-06 NOTE — PROGRESS NOTES
Primary Care Physician: Kunal Poole DO  Primary Cardiologist:      Date of Visit: 08/06/2024  1:40 PM EDT  Location of visit:  [address]   Type of Visit: Follow up         Chief Complaint   Patient presents with    Follow-up     Heart failure       HPI / Summary:   Mitch Olivarez is a 63 y.o. male with CAD hx NSTEMI 7/23/2021 s/p aspiration thrombectomy and  PCI to the RCA,  chronic HFmrEF 40-45%, HTN, HLD, T2DM not requiring insulin,  morbid obesity BMI 40,  ED returns for routine follow up     Feeling well without chest discomfort or unusual dyspnea.  He is frustrated with dietary efforts for DM and weight loss                  12 system review is negative except as noted above      Medical History:   Past Medical History:   Diagnosis Date    CHF (congestive heart failure) (Multi)     Depression, unspecified 07/19/2019    Depression    Diabetes mellitus (Multi)     ED (erectile dysfunction)     Heart disease     Hyperlipemia     Hypertension     Obesity     Personal history of other diseases of the respiratory system 03/21/2017    History of acute sinusitis    Personal history of other diseases of the respiratory system 03/21/2017    History of acute sinusitis    Personal history of other specified conditions 10/10/2014    History of dysuria       Surgical History:   Past Surgical History:   Procedure Laterality Date    ANKLE SURGERY  04/20/2015    Ankle Surgery    CARDIAC SURGERY      two stents placed    TONSILLECTOMY  04/20/2015    Tonsillectomy    VASCULAR SURGERY         Family History:   Family History   Problem Relation Name Age of Onset    Other (htn) Father         Social History:   Tobacco Use: High Risk (8/6/2024)    Patient History     Smoking Tobacco Use: Every Day     Smokeless Tobacco Use: Unknown     Passive Exposure: Not on file             MEDICATIONS:   Current Outpatient Medications   Medication Instructions    albuterol 90 mcg/actuation inhaler 2 puffs, 4 times daily PRN    amLODIPine  (NORVASC) 10 mg, oral, Daily    aspirin 81 mg, oral, Daily    atorvastatin (LIPITOR) 80 mg, oral, Nightly    citalopram (CELEXA) 20 mg, oral, Daily    clopidogrel (PLAVIX) 75 mg, oral, Daily    furosemide (LASIX) 40 mg, oral, Daily    Jardiance 10 mg, oral, Daily    losartan (COZAAR) 50 mg, oral, Daily    metFORMIN (GLUCOPHAGE) 500 mg, oral, 2 times daily (morning and late afternoon)    metoprolol succinate XL (TOPROL-XL) 25 mg, oral, Daily, Do not crush or chew.    potassium chloride CR 20 mEq ER tablet 20 mEq, oral, Daily, Do not crush or chew    sildenafil (Viagra) 100 mg tablet 1 tablet, oral, As needed    spironolactone (ALDACTONE) 25 mg, oral, Daily         IMAGING REVIEWED:   Echocardiogram 1/24/2024  CONCLUSIONS:   1. Left ventricular systolic function is mildly decreased.   2. There is global hypokinesis of the left ventricle with minor regional variations.   3. Spectral Doppler shows an abnormal pattern of left ventricular diastolic filling.    Echocardiogram 7/26/2021  CONCLUSIONS:  1. The left ventricular systolic function is low normal with a 55% estimated ejection fraction.  2. Spectral Doppler shows an impaired relaxation pattern of left ventricular diastolic filling.  3. There is moderate concentric left ventricular hypertrophy.  4. There is mild mitral and tricuspid regurgitation.  5. The estimated pulmonary artery pressure is mildly elevated with the RVSP at 36.1 mmHg.      Cardiac Catheterization: 7/23/2021  CONCLUSIONS:  1. Single vessel coronary artery disease without proximal left anterior descending involvement.  2. Culprit vessel(s): right coronary artery.  3. Successful aspiration thrombectomy, PCI of RCA.  4. Elevated LV filling pressures.  5. Left Ventricular end-diastolic pressure = 36.      LABS:  CBC:   Lab Results   Component Value Date    WBC 4.9 01/24/2024    RBC 5.01 01/24/2024    HGB 14.6 01/24/2024    HCT 44.3 01/24/2024    MCV 88 01/24/2024    MCH 29.1 01/24/2024    MCHC 33.0  01/24/2024    RDW 13.7 01/24/2024     (L) 01/24/2024     CBC with Differential:    Lab Results   Component Value Date    WBC 4.9 01/24/2024    RBC 5.01 01/24/2024    HGB 14.6 01/24/2024    HCT 44.3 01/24/2024     (L) 01/24/2024    MCV 88 01/24/2024    MCH 29.1 01/24/2024    MCHC 33.0 01/24/2024    RDW 13.7 01/24/2024    NRBC 0.0 01/24/2024    LYMPHOPCT 6.1 01/23/2024    MONOPCT 8.0 01/23/2024    EOSPCT 0.1 01/23/2024    BASOPCT 0.4 01/23/2024    MONOSABS 0.67 01/23/2024    LYMPHSABS 0.51 (L) 01/23/2024    EOSABS 0.01 01/23/2024    BASOSABS 0.03 01/23/2024     CMP:    Lab Results   Component Value Date     03/06/2024    K 4.1 03/06/2024     03/06/2024    CO2 24 03/06/2024    BUN 23 03/06/2024    CREATININE 0.93 03/06/2024    GLUCOSE 112 (H) 03/06/2024    PROT 6.6 03/06/2024    CALCIUM 9.4 03/06/2024    BILITOT 0.5 03/06/2024    ALKPHOS 69 03/06/2024    AST 13 03/06/2024    ALT 15 03/06/2024     BMP:    Lab Results   Component Value Date     03/06/2024    K 4.1 03/06/2024     03/06/2024    CO2 24 03/06/2024    BUN 23 03/06/2024    CREATININE 0.93 03/06/2024    CALCIUM 9.4 03/06/2024    GLUCOSE 112 (H) 03/06/2024     Magnesium:  Lab Results   Component Value Date    MG 1.92 03/06/2024     Troponin:    Lab Results   Component Value Date    TROPHS 98 (HH) 01/23/2024    TROPHS 99 (HH) 01/23/2024     BNP:   Lab Results   Component Value Date     (H) 03/06/2024       Lipid Panel:  Lab Results   Component Value Date    HDL 35.2 (A) 07/24/2021    CHHDL 5.4 (A) 07/24/2021    VLDL 47 (H) 07/24/2021    TRIG 235 (H) 07/24/2021    NHDL 154 07/24/2021        Lab work and imaging results independently reviewed by me         Visit Vitals  /87   Pulse 51   Ht 1.829 m (6')   Wt 136 kg (300 lb)   SpO2 94%   BMI 40.69 kg/m²   Smoking Status Every Day   BSA 2.63 m²          ECG dated 1/23/2024 independently reviewed   NSR with PVCs, no significant STT abnormalities       Vitals reviewed.    Constitutional:       General: Awake.      Appearance: Normal and healthy appearance. Well-developed and not in distress. Obese.   Eyes:      General: Lids are normal.      Conjunctiva/sclera: Conjunctivae normal.      Pupils: Pupils are equal, round, and reactive to light.   HENT:      Nose: Nose normal.    Mouth/Throat:      Lips: Pink.      Mouth: Mucous membranes are moist.   Neck:      Vascular: JVD normal.   Pulmonary:      Effort: Pulmonary effort is normal.      Breath sounds: Normal breath sounds and air entry.   Chest:      Chest wall: Not tender to palpatation.   Cardiovascular:      PMI at left midclavicular line. Normal rate. Regular rhythm. Normal S1. Normal S2.       Murmurs: There is no murmur.      No rub.   Pulses:     Intact distal pulses.   Edema:     Peripheral edema absent.   Abdominal:      General: Bowel sounds are normal.      Palpations: Abdomen is soft.      Tenderness: There is no abdominal tenderness.   Musculoskeletal: Normal range of motion.      Cervical back: Full passive range of motion without pain, normal range of motion and neck supple. Skin:     General: Skin is warm and dry.   Neurological:      General: No focal deficit present.      Mental Status: Alert, oriented to person, place, and time and oriented to person, place and time. Mental status is at baseline.   Psychiatric:         Attention and Perception: Attention and perception normal.         Mood and Affect: Mood and affect normal.         Speech: Speech normal.         Behavior: Behavior normal. Behavior is cooperative.         Thought Content: Thought content normal.         Problem List Items Addressed This Visit             ICD-10-CM    Benign essential hypertension I10    Relevant Medications    amLODIPine (Norvasc) 10 mg tablet    metoprolol succinate XL (Toprol-XL) 25 mg 24 hr tablet    Chronic systolic heart failure (Multi) I50.22    Relevant Medications    clopidogrel (Plavix) 75 mg tablet    amLODIPine  (Norvasc) 10 mg tablet    metoprolol succinate XL (Toprol-XL) 25 mg 24 hr tablet     Other Visit Diagnoses         Codes    Presence of drug coated stent in coronary artery    -  Primary Z95.5    Relevant Medications    clopidogrel (Plavix) 75 mg tablet    aspirin 81 mg EC tablet    Heart failure, unspecified (Multi)     I50.9    Relevant Medications    furosemide (Lasix) 40 mg tablet    clopidogrel (Plavix) 75 mg tablet    amLODIPine (Norvasc) 10 mg tablet    losartan (Cozaar) 50 mg tablet    metoprolol succinate XL (Toprol-XL) 25 mg 24 hr tablet    spironolactone (Aldactone) 25 mg tablet    Pure hypercholesterolemia     E78.00    Relevant Medications    atorvastatin (Lipitor) 80 mg tablet            Biju is doing well from a CV perspective without angina or symptoms suggestive of decompensated HF.  Refilled all cardiac Rx     BP is elevated today on current RX which He is tolerating well .  We discussed the value of home BP monitoring.  He will continue dietary efforts   -- Amlodipine 10 mg   --Jardiance 10 mg  --Losartan 50 mg -- increase to 100 mg if BP remains suboptimally controlled   --Lasix 40 mg as needed for weight gain or edema   --Toprol Xl 25 mg   --spironolactone 25 mg     Most recent LDL 63 mg/dL with T2DM and coronary stent  ---Atorvastatin 80 mg HS   --DAPT with clopidogrel + ASA 81   Mediterranean / DASH diet   150 minutes of symptom limited exercise / week           08/08/24 at 10:01 AM - KIERSTEN Caldwell  30 min      Followup Appts:  Future Appointments   Date Time Provider Department [place]   10[facility]2/2024 10[id number]   2/12/2025 10:00 AM KIERSTEN [place] [id number] [place]

## 2024-09-29 PROCEDURE — RXMED WILLOW AMBULATORY MEDICATION CHARGE

## 2024-10-01 ENCOUNTER — PHARMACY VISIT (OUTPATIENT)
Dept: PHARMACY | Facility: CLINIC | Age: 64
End: 2024-10-01
Payer: COMMERCIAL

## 2024-10-02 ENCOUNTER — APPOINTMENT (OUTPATIENT)
Dept: PHARMACY | Facility: HOSPITAL | Age: 64
End: 2024-10-02
Payer: MEDICARE

## 2024-10-02 DIAGNOSIS — I50.22 CHRONIC SYSTOLIC HEART FAILURE: Primary | ICD-10-CM

## 2024-10-02 NOTE — PROGRESS NOTES
"  Clinical Pharmacy Appointment    Patient ID: Mitch Olivarez \"Biju\" is a 63 y.o. male who presents for Follow Up appointment.     Referring Provider: Mirella Gill APRN-C*  PCP: Kunal Poole, DO     Subjective     CONGESTIVE HEART FAILURE ASSESSMENT  Does patient follow with Cardiology: Yes    Date: 8/6/24  Next appointment: 2/12/25    Staging  Ejection Fraction: 44% (1/24/24)  NYHA Class: Class II - Slight limitation to physical activity  ACC/AHA Stage: C - Disease with symptoms    Symptom Assessment  Weight changes/edema?: No  Dyspnea?: None  Dizziness/syncope/palpitations?: No    Medication Therapy  Current Regimen (GDMT):  ARNI/ACEi/ARB: Yes - Losartan 50 mg daily (at target dose)  Beta Blocker: Yes - Metoprolol succinate 25 mg daily (target dose 200 mg daily)  MRA: Yes - Spironolactone 25 mg daily (at target dose)  SGLT2i: Yes - Jardiance 10 mg daily (at target  dose)    Other therapy:  Furosemide 40 mg daily     Adverse Effects: None     Monitoring:  BP/HR: Not checking regularly at home. Has been about 1 month since he last checked. Discussed importance of routine monitoring.   Weight: trying to lose weight   ED visits/hospitalizations:     Secondary Prevention  The ASCVD Risk score (Hossein DK, et al., 2019) failed to calculate for the following reasons:    The patient has a prior MI or stroke diagnosis  Aspirin 81mg? yes  Statin?: Yes - Atorvastatin 80 mg daily   HTN?: Yes - uncontrolled; also taking amlodipine 10 mg daily     Medication Reconciliation:  No changes    Drug Interactions  No relevant drug interactions were noted.    Medication System Management  Patient's preferred pharmacy: [facility] [place]  Adherence/Organization: No issues  Affordability/Accessibility: Jardiance - previously $500 per month.   PAP: Yes  Expiration: 4/5/25    Objective   Allergies   Allergen Reactions    Hydrocodone-Acetaminophen Unknown    Lisinopril Unknown     Social History     Social History Narrative    Not on " "file      Medication Review  Current Outpatient Medications   Medication Instructions    albuterol 90 mcg/actuation inhaler 2 puffs, 4 times daily PRN    amLODIPine (NORVASC) 10 mg, oral, Daily    aspirin 81 mg, oral, Daily    atorvastatin (LIPITOR) 80 mg, oral, Nightly    citalopram (CELEXA) 20 mg, oral, Daily    clopidogrel (PLAVIX) 75 mg, oral, Daily    furosemide (LASIX) 40 mg, oral, Daily    Jardiance 10 mg, oral, Daily    losartan (COZAAR) 50 mg, oral, Daily    metFORMIN (GLUCOPHAGE) 500 mg, oral, 2 times daily (morning and late afternoon)    metoprolol succinate XL (TOPROL-XL) 25 mg, oral, Daily, Do not crush or chew.    sildenafil (Viagra) 100 mg tablet 1 tablet, oral, As needed    spironolactone (ALDACTONE) 25 mg, oral, Daily      Vitals  BP Readings from Last 2 Encounters:   08/06/24 150/87   03/06/24 150/80     BMI Readings from Last 1 Encounters:   08/06/24 40.69 kg/m²      Labs  A1C  Lab Results   Component Value Date    HGBA1C 6.8 (H) 04/23/2024    HGBA1C 6.7 (H) 09/29/2023    HGBA1C 7.0 (H) 02/24/2023     BMP  Lab Results   Component Value Date    CALCIUM 9.4 03/06/2024     03/06/2024    K 4.1 03/06/2024    CO2 24 03/06/2024     03/06/2024    BUN 23 03/06/2024    CREATININE 0.93 03/06/2024    EGFR >90 03/06/2024     LFTs  Lab Results   Component Value Date    ALT 15 03/06/2024    AST 13 03/06/2024    ALKPHOS 69 03/06/2024    BILITOT 0.5 03/06/2024     FLP  Lab Results   Component Value Date    TRIG 235 (H) 07/24/2021    CHOL 189 07/24/2021    LDLF 107 (H) 07/24/2021    HDL 35.2 (A) 07/24/2021     Urine Microalbumin  No results found for: \"MICROALBCREA\"  Weight Management  Wt Readings from Last 3 Encounters:   08/06/24 136 kg (300 lb)   03/06/24 142 kg (313 lb 11.4 oz)   01/24/24 137 kg (301 lb 8 oz)      There is no height or weight on file to calculate BMI.     Assessment/Plan   Problem List Items Addressed This Visit       Chronic systolic heart failure - Primary     Patient has Stage C " Class II HFmrEF with most recent EF 44%. Patient is on complete GDMT.     Rationale for plan: Patient denies any current HF symptoms. Reports that he is not routinely checking BP at home. Discussed importance of checking BP at least a few times per week. Reviewed BP goals and consequences of uncontrolled BP. Patient is agreeable to checking BP more consistently. Also discussed that patient is actively working to lose weight. Reports that he is eating better and has seen some progress with weight loss. Discussed heart healthy diet and exercise goals. At this time, will continue current regimen.     Medication Changes: None   CONTINUE:  Losartan 50 mg daily (at target dose)  Metoprolol succinate 25 mg daily (target dose 200 mg daily)  Spironolactone 25 mg daily (at target dose)  Jardiance 10 mg daily (at target  dose)  Furosemide 40 mg daily     Monitoring and Education:  Weigh yourself without clothing daily after using the bathroom first thing in the morning before breakfast   Contact your physician/seek help immediately if you notice the following with symptoms of shortness of breath or swelling in your extremities:   Weight gain of 3+ lbs overnight   Weight gain of 5+ lbs in a week   Physical limitations to your normal physical activity level   Limit fluid intake as instructed by your doctor and follow a heart friendly diet low in salt, fat, and focused in lean meats   Aim to exercise for 30 minutes anywhere between 3 to 5 times a week or more, depending on your physical limitations   Keep a log of your daily BP, HR and weight to share with providers   If you are a smoker or drink alcohol, consider cessation to improve your heart health     Clinical Pharmacist follow-up: 3 months - 1/3/24 @ 1030 , Telehealth visit    Continue all meds under the continuation of care with the referring provider and clinical pharmacy team.    Thank you,  Lynne Cabral, PharmD  Clinical Pharmacist  [phone]     Verbal  consent to manage patient's drug therapy was obtained from the patient. They were informed they may decline to participate or withdraw from participation in pharmacy services at any time.

## 2024-10-09 DIAGNOSIS — I50.9 HEART FAILURE, UNSPECIFIED: ICD-10-CM

## 2024-10-11 RX ORDER — FUROSEMIDE 40 MG/1
40 TABLET ORAL DAILY
Qty: 90 TABLET | Refills: 3 | Status: SHIPPED | OUTPATIENT
Start: 2024-10-11 | End: 2025-10-06

## 2024-12-30 ENCOUNTER — PHARMACY VISIT (OUTPATIENT)
Dept: PHARMACY | Facility: CLINIC | Age: 64
End: 2024-12-30
Payer: COMMERCIAL

## 2024-12-30 PROCEDURE — RXMED WILLOW AMBULATORY MEDICATION CHARGE

## 2025-01-08 ENCOUNTER — APPOINTMENT (OUTPATIENT)
Dept: PHARMACY | Facility: HOSPITAL | Age: 65
End: 2025-01-08
Payer: MEDICARE

## 2025-01-08 DIAGNOSIS — I50.22 CHRONIC SYSTOLIC HEART FAILURE: ICD-10-CM

## 2025-01-08 NOTE — PROGRESS NOTES
"  Clinical Pharmacy Appointment    Patient ID: Mitch Olivarez \"Biju\" is a 64 y.o. male who presents for Follow Up appointment.     Referring Provider: Mirella Gill, KATIUSKA*  PCP: Kunal Poole, DO     Subjective     CONGESTIVE HEART FAILURE ASSESSMENT  Does patient follow with Cardiology: Yes    Date: 8/6/24  Next appointment: 2/12/25    Staging  Ejection Fraction: 44% (1/24/24)  NYHA Class: Class II - Slight limitation to physical activity  ACC/AHA Stage: C - Disease with symptoms    Symptom Assessment  Weight changes/edema?: No significant changes - has lost a little bit of weight intentionally   Dyspnea?: None  Dizziness/syncope/palpitations?: No    Medication Therapy  Current Regimen (GDMT):  ARNI/ACEi/ARB: Yes - Losartan 50 mg daily (at target dose)  Beta Blocker: Yes - Metoprolol succinate 25 mg daily (target dose 200 mg daily)  MRA: Yes - Spironolactone 25 mg daily (at target dose)  SGLT2i: Yes - Jardiance 10 mg daily (at target  dose)    Other therapy:  Furosemide 40 mg daily     Adverse Effects: None     Monitoring:  BP/HR: Not checking regularly at home. Has been about 1 month since he last checked. Discussed importance of routine monitoring. No significant changes in BP.   Weight: trying to lose weight - lost some weight  ED visits/hospitalizations: None     Secondary Prevention  The ASCVD Risk score (Hossein NIELSEN, et al., 2019) failed to calculate for the following reasons:    Risk score cannot be calculated because patient has a medical history suggesting prior/existing ASCVD  Aspirin 81mg? yes  Statin?: Yes - Atorvastatin 80 mg daily   HTN?: Yes - uncontrolled; also taking amlodipine 10 mg daily     Medication Reconciliation:  No changes    Drug Interactions  No relevant drug interactions were noted.    Medication System Management  Patient's preferred pharmacy: [facility] [place]  Adherence/Organization: No issues  Affordability/Accessibility: Jardiance - previously $500 per month.   PAP: " "Yes  Expiration: 4/5/25    Objective   Allergies   Allergen Reactions    Hydrocodone-Acetaminophen Unknown    Lisinopril Unknown     Social History     Social History Narrative    Not on file      Medication Review  Current Outpatient Medications   Medication Instructions    albuterol 90 mcg/actuation inhaler 2 puffs, 4 times daily PRN    amLODIPine (NORVASC) 10 mg, oral, Daily    aspirin 81 mg, oral, Daily    atorvastatin (LIPITOR) 80 mg, oral, Nightly    citalopram (CELEXA) 20 mg, oral, Daily    clopidogrel (PLAVIX) 75 mg, oral, Daily    furosemide (LASIX) 40 mg, oral, Daily    Jardiance 10 mg, oral, Daily    losartan (COZAAR) 50 mg, oral, Daily    metFORMIN (GLUCOPHAGE) 500 mg, oral, 2 times daily (morning and late afternoon)    metoprolol succinate XL (TOPROL-XL) 25 mg, oral, Daily, Do not crush or chew.    sildenafil (Viagra) 100 mg tablet 1 tablet, oral, As needed    spironolactone (ALDACTONE) 25 mg, oral, Daily      Vitals  BP Readings from Last 2 Encounters:   08/06/24 150/87   03/06/24 150/80     BMI Readings from Last 1 Encounters:   08/06/24 40.69 kg/m²      Labs  A1C  Lab Results   Component Value Date    HGBA1C 6.8 (H) 04/23/2024    HGBA1C 6.7 (H) 09/29/2023    HGBA1C 7.0 (H) 02/24/2023     BMP  Lab Results   Component Value Date    CALCIUM 9.4 03/06/2024     03/06/2024    K 4.1 03/06/2024    CO2 24 03/06/2024     03/06/2024    BUN 23 03/06/2024    CREATININE 0.93 03/06/2024    EGFR >90 03/06/2024     LFTs  Lab Results   Component Value Date    ALT 15 03/06/2024    AST 13 03/06/2024    ALKPHOS 69 03/06/2024    BILITOT 0.5 03/06/2024     FLP  Lab Results   Component Value Date    TRIG 235 (H) 07/24/2021    CHOL 189 07/24/2021    LDLF 107 (H) 07/24/2021    HDL 35.2 (A) 07/24/2021     Urine Microalbumin  No results found for: \"MICROALBCREA\"  Weight Management  Wt Readings from Last 3 Encounters:   08/06/24 136 kg (300 lb)   03/06/24 142 kg (313 lb 11.4 oz)   01/24/24 137 kg (301 lb 8 oz)    " "  There is no height or weight on file to calculate BMI.     Assessment/Plan   Problem List Items Addressed This Visit       Chronic systolic heart failure    Relevant Orders    Referral to Clinical Pharmacy     Patient has Stage C Class II HFmrEF with most recent EF 44%. Patient is on complete GDMT.     Rationale for plan: Patient denies any current HF symptoms. Reports that he is not routinely checking BP at home - checks when he feels different. No significant changes in BP or HR noted. Discussed importance of checking BP at least a few times per week. Reviewed BP goals and consequences of uncontrolled BP. Also discussed that patient is actively working to lose weight. Reports that he is eating better and has seen some progress with weight loss. He is down a \"few\" pounds and notes no weight gain. Discussed heart healthy diet and exercise goals. At this time, will continue current regimen. Patient is enrolled in  PAP for Jardiance (expiration 4/5/2025). Plan for renewal at follow up visit.     Medication Changes: None   CONTINUE:  Losartan 50 mg daily (at target dose)  Metoprolol succinate 25 mg daily (target dose 200 mg daily)  Spironolactone 25 mg daily (at target dose)  Jardiance 10 mg daily (at target  dose)  Furosemide 40 mg daily     Monitoring and Education:  Weigh yourself without clothing daily after using the bathroom first thing in the morning before breakfast   Contact your physician/seek help immediately if you notice the following with symptoms of shortness of breath or swelling in your extremities:   Weight gain of 3+ lbs overnight   Weight gain of 5+ lbs in a week   Physical limitations to your normal physical activity level   Limit fluid intake as instructed by your doctor and follow a heart friendly diet low in salt, fat, and focused in lean meats   Aim to exercise for 30 minutes anywhere between 3 to 5 times a week or more, depending on your physical limitations   Keep a log of your daily BP, " HR and weight to share with providers   If you are a smoker or drink alcohol, consider cessation to improve your heart health     Clinical Pharmacist follow-up: 3 months for VAF renewal - 3/24/25 @ 1020 , Telehealth visit    Continue all meds under the continuation of care with the referring provider and clinical pharmacy team.    Thank you,  Lynne Cabral, PharmD  Clinical Pharmacist  [phone]     Verbal consent to manage patient's drug therapy was obtained from the patient. They were informed they may decline to participate or withdraw from participation in pharmacy services at any time.

## 2025-02-12 ENCOUNTER — APPOINTMENT (OUTPATIENT)
Dept: CARDIOLOGY | Facility: CLINIC | Age: 65
End: 2025-02-12
Payer: MEDICARE

## 2025-03-05 ENCOUNTER — APPOINTMENT (OUTPATIENT)
Dept: CARDIOLOGY | Facility: CLINIC | Age: 65
End: 2025-03-05
Payer: MEDICARE

## 2025-03-05 VITALS
OXYGEN SATURATION: 97 % | HEART RATE: 64 BPM | BODY MASS INDEX: 40.2 KG/M2 | DIASTOLIC BLOOD PRESSURE: 78 MMHG | HEIGHT: 73 IN | SYSTOLIC BLOOD PRESSURE: 139 MMHG | WEIGHT: 303.35 LBS

## 2025-03-05 DIAGNOSIS — F17.200 TOBACCO USE DISORDER: Primary | ICD-10-CM

## 2025-03-05 DIAGNOSIS — I10 BENIGN ESSENTIAL HYPERTENSION: ICD-10-CM

## 2025-03-05 DIAGNOSIS — I50.22 CHRONIC SYSTOLIC HEART FAILURE: ICD-10-CM

## 2025-03-05 DIAGNOSIS — E78.2 HYPERLIPIDEMIA, MIXED: ICD-10-CM

## 2025-03-05 DIAGNOSIS — Z95.5 HISTORY OF CORONARY ARTERY STENT PLACEMENT: ICD-10-CM

## 2025-03-05 PROCEDURE — 1159F MED LIST DOCD IN RCRD: CPT | Performed by: NURSE PRACTITIONER

## 2025-03-05 PROCEDURE — 99213 OFFICE O/P EST LOW 20 MIN: CPT | Performed by: NURSE PRACTITIONER

## 2025-03-05 PROCEDURE — 3075F SYST BP GE 130 - 139MM HG: CPT | Performed by: NURSE PRACTITIONER

## 2025-03-05 PROCEDURE — G2211 COMPLEX E/M VISIT ADD ON: HCPCS | Performed by: NURSE PRACTITIONER

## 2025-03-05 PROCEDURE — 3008F BODY MASS INDEX DOCD: CPT | Performed by: NURSE PRACTITIONER

## 2025-03-05 PROCEDURE — 99406 BEHAV CHNG SMOKING 3-10 MIN: CPT | Performed by: NURSE PRACTITIONER

## 2025-03-05 PROCEDURE — 3078F DIAST BP <80 MM HG: CPT | Performed by: NURSE PRACTITIONER

## 2025-03-05 RX ORDER — IBUPROFEN 200 MG
1 TABLET ORAL EVERY 24 HOURS
Qty: 30 PATCH | Refills: 0 | Status: SHIPPED | OUTPATIENT
Start: 2025-03-05 | End: 2025-04-04

## 2025-03-05 NOTE — PROGRESS NOTES
Primary Care Physician: Kunal Poole DO  Primary Cardiologist:      Date of Visit: 03/05/2025 11:00 AM EST  Location of visit:  [address]   Type of Visit: Follow up      Chief Complaint   Patient presents with    Follow-up     6 month      HPI / Summary:   Mitch Olivarez is a 64 y.o. male with CAD hx NSTEMI 7/23/2021 s/p aspiration thrombectomy and  PCI to the RCA,  chronic HFmrEF 40-45%, HTN, HLD, T2DM not requiring insulin,  morbid obesity BMI 40,  ED returns for routine follow up     More life hard ships since October - struggling to try to quit smoking. He continues to work without limitations in activity. Overall doing well from CV perspective. Feeling well without chest discomfort or unusual dyspnea.      12 system review is negative except as noted above    Medical History:   Past Medical History:   Diagnosis Date    CHF (congestive heart failure)     Depression, unspecified 07/19/2019    Depression    Diabetes mellitus (Multi)     ED (erectile dysfunction)     Heart disease     Hyperlipemia     Hypertension     Obesity     Personal history of other diseases of the respiratory system 03/21/2017    History of acute sinusitis    Personal history of other diseases of the respiratory system 03/21/2017    History of acute sinusitis    Personal history of other specified conditions 10/10/2014    History of dysuria       Surgical History:   Past Surgical History:   Procedure Laterality Date    ANKLE SURGERY  04/20/2015    Ankle Surgery    CARDIAC SURGERY      two stents placed    TONSILLECTOMY  04/20/2015    Tonsillectomy    VASCULAR SURGERY         Family History:   Family History   Problem Relation Name Age of Onset    Other (htn) Father         Social History:   Tobacco Use: High Risk (3/5/2025)    Patient History     Smoking Tobacco Use: Some Days     Smokeless Tobacco Use: Former     Passive Exposure: Not on file         MEDICATIONS:   Current Outpatient Medications   Medication Instructions    albuterol 90  mcg/actuation inhaler 2 puffs, 4 times daily PRN    amLODIPine (NORVASC) 10 mg, oral, Daily    aspirin 81 mg, oral, Daily    atorvastatin (LIPITOR) 80 mg, oral, Nightly    citalopram (CELEXA) 20 mg, Daily    clopidogrel (PLAVIX) 75 mg, oral, Daily    furosemide (LASIX) 40 mg, oral, Daily    Jardiance 10 mg, oral, Daily    losartan (COZAAR) 50 mg, oral, Daily    metFORMIN (GLUCOPHAGE) 500 mg, oral, 2 times daily (morning and late afternoon)    metoprolol succinate XL (TOPROL-XL) 25 mg, oral, Daily, Do not crush or chew.    nicotine (Nicoderm CQ) 21 mg/24 hr patch 1 patch, transdermal, Every 24 hours    sildenafil (Viagra) 100 mg tablet 1 tablet, As needed    spironolactone (ALDACTONE) 25 mg, oral, Daily       IMAGING REVIEWED:   Echocardiogram 1/24/2024  CONCLUSIONS:   1. Left ventricular systolic function is mildly decreased.   2. There is global hypokinesis of the left ventricle with minor regional variations.   3. Spectral Doppler shows an abnormal pattern of left ventricular diastolic filling.    Echocardiogram 7/26/2021  CONCLUSIONS:  1. The left ventricular systolic function is low normal with a 55% estimated ejection fraction.  2. Spectral Doppler shows an impaired relaxation pattern of left ventricular diastolic filling.  3. There is moderate concentric left ventricular hypertrophy.  4. There is mild mitral and tricuspid regurgitation.  5. The estimated pulmonary artery pressure is mildly elevated with the RVSP at 36.1 mmHg.      Cardiac Catheterization: 7/23/2021  CONCLUSIONS:  1. Single vessel coronary artery disease without proximal left anterior descending involvement.  2. Culprit vessel(s): right coronary artery.  3. Successful aspiration thrombectomy, PCI of RCA.  4. Elevated LV filling pressures.  5. Left Ventricular end-diastolic pressure = 36.      LABS:  CBC:   Lab Results   Component Value Date    WBC 4.9 01/24/2024    RBC 5.01 01/24/2024    HGB 14.6 01/24/2024    HCT 44.3 01/24/2024    MCV 88  "01/24/2024    MCH 29.1 01/24/2024    MCHC 33.0 01/24/2024    RDW 13.7 01/24/2024     (L) 01/24/2024     CBC with Differential:    Lab Results   Component Value Date    WBC 4.9 01/24/2024    RBC 5.01 01/24/2024    HGB 14.6 01/24/2024    HCT 44.3 01/24/2024     (L) 01/24/2024    MCV 88 01/24/2024    MCH 29.1 01/24/2024    MCHC 33.0 01/24/2024    RDW 13.7 01/24/2024    NRBC 0.0 01/24/2024    LYMPHOPCT 6.1 01/23/2024    MONOPCT 8.0 01/23/2024    EOSPCT 0.1 01/23/2024    BASOPCT 0.4 01/23/2024    MONOSABS 0.67 01/23/2024    LYMPHSABS 0.51 (L) 01/23/2024    EOSABS 0.01 01/23/2024    BASOSABS 0.03 01/23/2024     CMP:    Lab Results   Component Value Date     03/06/2024    K 4.1 03/06/2024     03/06/2024    CO2 24 03/06/2024    BUN 23 03/06/2024    CREATININE 0.93 03/06/2024    GLUCOSE 112 (H) 03/06/2024    PROT 6.6 03/06/2024    CALCIUM 9.4 03/06/2024    BILITOT 0.5 03/06/2024    ALKPHOS 69 03/06/2024    AST 13 03/06/2024    ALT 15 03/06/2024     BMP:    Lab Results   Component Value Date     03/06/2024    K 4.1 03/06/2024     03/06/2024    CO2 24 03/06/2024    BUN 23 03/06/2024    CREATININE 0.93 03/06/2024    CALCIUM 9.4 03/06/2024    GLUCOSE 112 (H) 03/06/2024     Magnesium:  Lab Results   Component Value Date    MG 1.92 03/06/2024     Troponin:    Lab Results   Component Value Date    TROPHS 98 (HH) 01/23/2024    TROPHS 99 (HH) 01/23/2024     BNP:   Lab Results   Component Value Date     (H) 03/06/2024       Lipid Panel:  Lab Results   Component Value Date    HDL 35.2 (A) 07/24/2021    CHHDL 5.4 (A) 07/24/2021    VLDL 47 (H) 07/24/2021    TRIG 235 (H) 07/24/2021    NHDL 154 07/24/2021        Lab work and imaging results independently reviewed by me         Visit Vitals  /78   Pulse 64   Ht 1.854 m (6' 1\")   Wt 138 kg (303 lb 5.7 oz)   SpO2 97%   BMI 40.02 kg/m²   Smoking Status Some Days   BSA 2.67 m²          ECG dated 1/23/2024 independently reviewed   NSR with PVCs, " no significant STT abnormalities       Vitals reviewed.   Constitutional:       General: Awake.      Appearance: Normal and healthy appearance. Well-developed and not in distress. Obese.   Eyes:      General: Lids are normal.      Conjunctiva/sclera: Conjunctivae normal.      Pupils: Pupils are equal, round, and reactive to light.   HENT:      Nose: Nose normal.    Mouth/Throat:      Lips: Pink.      Mouth: Mucous membranes are moist.   Neck:      Vascular: JVD normal.   Pulmonary:      Effort: Pulmonary effort is normal.      Breath sounds: Normal breath sounds and air entry.   Chest:      Chest wall: Not tender to palpatation.   Cardiovascular:      PMI at left midclavicular line. Normal rate. Regular rhythm. Normal S1. Normal S2.       Murmurs: There is no murmur.      No rub.   Pulses:     Intact distal pulses.   Edema:     Peripheral edema absent.   Abdominal:      General: Bowel sounds are normal.      Palpations: Abdomen is soft.      Tenderness: There is no abdominal tenderness.   Musculoskeletal: Normal range of motion.      Cervical back: Full passive range of motion without pain, normal range of motion and neck supple. Skin:     General: Skin is warm and dry.   Neurological:      General: No focal deficit present.      Mental Status: Alert, oriented to person, place, and time and oriented to person, place and time. Mental status is at baseline.   Psychiatric:         Attention and Perception: Attention and perception normal.         Mood and Affect: Mood and affect normal.         Speech: Speech normal.         Behavior: Behavior normal. Behavior is cooperative.         Thought Content: Thought content normal.         Problem List Items Addressed This Visit             ICD-10-CM    Chronic systolic heart failure I50.22    Benign essential hypertension I10    Hyperlipidemia, mixed E78.2    Tobacco use disorder - Primary F17.200    Relevant Medications    nicotine (Nicoderm CQ) 21 mg/24 hr patch    History  of coronary artery stent placement Z95.5       Biju is doing well from a CV perspective without angina or symptoms suggestive of decompensated HF.  Refilled all cardiac Rx     HTN, goal < 130/80 mmHG   BP is elevated today on current RX which He is tolerating well .  We discussed the value of home BP monitoring.  He will continue dietary efforts   -- Amlodipine 10 mg   --Jardiance 10 mg  --Losartan 50 mg -- increase to 100 mg if BP remains suboptimally controlled   --Lasix 40 mg as needed for weight gain or edema   --Toprol Xl 25 mg daily  --spironolactone 25 mg daily    Most recent LDL 63 mg/dL with T2DM and coronary stent  ---Atorvastatin 80 mg HS   --DAPT with clopidogrel + ASA 81   Mediterranean / DASH diet   150 minutes of symptom limited exercise / week       current smoker  We discussed the importance of complete smoking cessation for 5 minutes.  He    Is agreeable to nicotine patch. He has tried in the past but dose on patch was not strong enough per Biju. Discussed he is to not smoke at all while wearing the patch.        Follow up in 1 year or sooner if needed  Encouraged to call with questions or concerns      03/12/25 at 8:12 AM - KIERSTEN Franco  30 min      Followup Appts:  Future Appointments   Date Time Provider Department Center   3/24/2025 10:20 AM Lynne Cabral, PharmD [id number]   3/9/2026 10:00 AM KIERSTEN Caldwell [id number] [facility]

## 2025-03-12 PROBLEM — E66.813 CLASS 3 SEVERE OBESITY WITH SERIOUS COMORBIDITY AND BODY MASS INDEX (BMI) OF 40.0 TO 44.9 IN ADULT: Status: ACTIVE | Noted: 2025-03-12

## 2025-03-12 PROBLEM — E66.01 CLASS 3 SEVERE OBESITY WITH SERIOUS COMORBIDITY AND BODY MASS INDEX (BMI) OF 40.0 TO 44.9 IN ADULT: Status: ACTIVE | Noted: 2025-03-12

## 2025-03-24 ENCOUNTER — APPOINTMENT (OUTPATIENT)
Dept: PHARMACY | Facility: HOSPITAL | Age: 65
End: 2025-03-24
Payer: MEDICARE

## 2025-03-24 ENCOUNTER — PHARMACY VISIT (OUTPATIENT)
Dept: PHARMACY | Facility: CLINIC | Age: 65
End: 2025-03-24
Payer: COMMERCIAL

## 2025-03-24 DIAGNOSIS — I50.22 CHRONIC SYSTOLIC HEART FAILURE: ICD-10-CM

## 2025-03-24 PROCEDURE — RXMED WILLOW AMBULATORY MEDICATION CHARGE

## 2025-03-24 NOTE — PROGRESS NOTES
"  Clinical Pharmacy Appointment    Patient ID: Mitch Olivarez \"Biju\" is a 64 y.o. male who presents for CHF.    This is Follow Up appointment.     Referring Provider: Mirella Gill APRN-C*  PCP: Kunal Poole, DO     Subjective     CONGESTIVE HEART FAILURE ASSESSMENT  Does patient follow with Cardiology: Yes    Date: 3/5/25    Staging  Ejection Fraction: 44% (1/24/24)  NYHA Class: Class II - Slight limitation to physical activity  ACC/AHA Stage: C - Disease with symptoms    Symptom Assessment  Weight changes/edema?: No  Dyspnea?: None  Dizziness/syncope/palpitations?: No    Medication Therapy  Current Regimen (GDMT):  ARNI/ACEi/ARB: Yes - Losartan 50 mg daily (at target dose)  Beta Blocker: Yes - Metoprolol succinate 25 mg daily (target dose 200 mg daily)  MRA: Yes - Spironolactone 25 mg daily (at target dose)  SGLT2i: Yes - Jardiance 10 mg daily (at target  dose)    Other therapy:  Furosemide 40 mg daily     Adverse Effects: None     Monitoring:  BP/HR: Not checking regularly at home. Has been about 1 month since he last checked. Discussed importance of routine monitoring. No significant changes in BP.   Weight: trying to lose weight - lost some weight  ED visits/hospitalizations: None     Secondary Prevention  The ASCVD Risk score (Hossein DK, et al., 2019) failed to calculate for the following reasons:    Risk score cannot be calculated because patient has a medical history suggesting prior/existing ASCVD  Aspirin 81mg? yes  Statin?: Yes - Atorvastatin 80 mg daily   HTN?: Yes - uncontrolled; also taking amlodipine 10 mg daily     Medication Reconciliation:  No changes    Drug Interactions  No relevant drug interactions were noted.    Medication System Management  Patient's preferred pharmacy: [facility] [place]  Adherence/Organization: No issues  Affordability/Accessibility: Jardiance - previously $500 per month.   PAP: Yes  Medication: Jardiance   Expiration: 4/5/25    Objective   Allergies   Allergen " "Reactions    Hydrocodone-Acetaminophen Unknown    Lisinopril Unknown     Social History     Social History Narrative    Not on file      Medication Review  Current Outpatient Medications   Medication Instructions    albuterol 90 mcg/actuation inhaler 2 puffs, 4 times daily PRN    amLODIPine (NORVASC) 10 mg, oral, Daily    aspirin 81 mg, oral, Daily    atorvastatin (LIPITOR) 80 mg, oral, Nightly    citalopram (CELEXA) 20 mg, Daily    clopidogrel (PLAVIX) 75 mg, oral, Daily    furosemide (LASIX) 40 mg, oral, Daily    Jardiance 10 mg, oral, Daily    losartan (COZAAR) 50 mg, oral, Daily    metFORMIN (GLUCOPHAGE) 500 mg, oral, 2 times daily (morning and late afternoon)    metoprolol succinate XL (TOPROL-XL) 25 mg, oral, Daily, Do not crush or chew.    nicotine (Nicoderm CQ) 21 mg/24 hr patch 1 patch, transdermal, Every 24 hours    sildenafil (Viagra) 100 mg tablet 1 tablet, As needed    spironolactone (ALDACTONE) 25 mg, oral, Daily      Vitals  BP Readings from Last 2 Encounters:   03/05/25 139/78   08/06/24 150/87     BMI Readings from Last 1 Encounters:   03/05/25 40.02 kg/m²      Labs  A1C  Lab Results   Component Value Date    HGBA1C 6.8 (H) 04/23/2024    HGBA1C 6.7 (H) 09/29/2023    HGBA1C 7.0 (H) 02/24/2023     BMP  Lab Results   Component Value Date    CALCIUM 9.4 03/06/2024     03/06/2024    K 4.1 03/06/2024    CO2 24 03/06/2024     03/06/2024    BUN 23 03/06/2024    CREATININE 0.93 03/06/2024    EGFR >90 03/06/2024     LFTs  Lab Results   Component Value Date    ALT 15 03/06/2024    AST 13 03/06/2024    ALKPHOS 69 03/06/2024    BILITOT 0.5 03/06/2024     FLP  Lab Results   Component Value Date    TRIG 235 (H) 07/24/2021    CHOL 189 07/24/2021    LDLF 107 (H) 07/24/2021    HDL 35.2 (A) 07/24/2021     Urine Microalbumin  No results found for: \"MICROALBCREA\"  Weight Management  Wt Readings from Last 3 Encounters:   03/05/25 138 kg (303 lb 5.7 oz)   08/06/24 136 kg (300 lb)   03/06/24 142 kg (313 lb 11.4 " "oz)      There is no height or weight on file to calculate BMI.     Assessment/Plan   Problem List Items Addressed This Visit       Chronic systolic heart failure     Patient has Stage C Class II HFmrEF with most recent EF 44%. Patient is on complete GDMT.     Rationale for plan: Patient denies any current HF symptoms. Reports that he is not routinely checking BP at home - checks when he feels different. No significant changes in BP or HR noted. Discussed importance of checking BP at least a few times per week. Reviewed BP goals and consequences of uncontrolled BP. Also discussed that patient is actively working to lose weight. Reports that he is eating better and has seen some progress with weight loss. He is down a \"few\" pounds and notes no weight gain. Discussed heart healthy diet and exercise goals. At this time, will continue current regimen. Patient is enrolled in  PAP for Jardiance (expiration 4/5/2025). Plan to submit renewal once financials received. After that time, patient requests 1 year follow up.     Medication Changes:   CONTINUE:  Losartan 50 mg daily   Metoprolol succinate 25 mg daily  Spironolactone 25 mg daily   Jardiance 10 mg daily   Furosemide 40 mg daily     Monitoring and Education:  Weigh yourself without clothing daily after using the bathroom first thing in the morning before breakfast   Contact your physician/seek help immediately if you notice the following with symptoms of shortness of breath or swelling in your extremities:   Weight gain of 3+ lbs overnight   Weight gain of 5+ lbs in a week   Physical limitations to your normal physical activity level   Limit fluid intake as instructed by your doctor and follow a heart friendly diet low in salt, fat, and focused in lean meats   Aim to exercise for 30 minutes anywhere between 3 to 5 times a week or more, depending on your physical limitations   Keep a log of your daily BP, HR and weight to share with providers   If you are a smoker or " drink alcohol, consider cessation to improve your heart health     Clinical Pharmacist follow-up: 1 year VAF renewal, Telehealth visit    Continue all meds under the continuation of care with the referring provider and clinical pharmacy team.    Thank you,  Lynne Cabral, PharmD  Clinical Pharmacist  [phone]     Verbal consent to manage patient's drug therapy was obtained from the patient. They were informed they may decline to participate or withdraw from participation in pharmacy services at any time.

## 2025-06-23 ENCOUNTER — PHARMACY VISIT (OUTPATIENT)
Dept: PHARMACY | Facility: CLINIC | Age: 65
End: 2025-06-23
Payer: COMMERCIAL

## 2025-06-23 PROCEDURE — RXMED WILLOW AMBULATORY MEDICATION CHARGE

## 2026-03-09 ENCOUNTER — APPOINTMENT (OUTPATIENT)
Dept: CARDIOLOGY | Facility: CLINIC | Age: 66
End: 2026-03-09
Payer: MEDICARE